# Patient Record
Sex: FEMALE | Race: WHITE | NOT HISPANIC OR LATINO
[De-identification: names, ages, dates, MRNs, and addresses within clinical notes are randomized per-mention and may not be internally consistent; named-entity substitution may affect disease eponyms.]

---

## 2017-01-02 ENCOUNTER — RESULT REVIEW (OUTPATIENT)
Age: 77
End: 2017-01-02

## 2017-01-02 PROBLEM — Z78.9 ALCOHOL INGESTION: Status: ACTIVE | Noted: 2017-01-02

## 2017-01-03 ENCOUNTER — APPOINTMENT (OUTPATIENT)
Dept: HEMATOLOGY ONCOLOGY | Facility: CLINIC | Age: 77
End: 2017-01-03

## 2017-01-03 ENCOUNTER — APPOINTMENT (OUTPATIENT)
Age: 77
End: 2017-01-03

## 2017-01-03 VITALS
SYSTOLIC BLOOD PRESSURE: 162 MMHG | OXYGEN SATURATION: 100 % | BODY MASS INDEX: 20.2 KG/M2 | DIASTOLIC BLOOD PRESSURE: 66 MMHG | TEMPERATURE: 97.9 F | WEIGHT: 123.9 LBS | HEART RATE: 78 BPM | RESPIRATION RATE: 16 BRPM

## 2017-01-03 DIAGNOSIS — Z80.1 FAMILY HISTORY OF MALIGNANT NEOPLASM OF TRACHEA, BRONCHUS AND LUNG: ICD-10-CM

## 2017-01-03 DIAGNOSIS — C85.90 NON-HODGKIN LYMPHOMA, UNSPECIFIED, UNSPECIFIED SITE: ICD-10-CM

## 2017-01-03 DIAGNOSIS — G47.30 SLEEP APNEA, UNSPECIFIED: ICD-10-CM

## 2017-01-03 DIAGNOSIS — Z78.9 OTHER SPECIFIED HEALTH STATUS: ICD-10-CM

## 2017-01-03 DIAGNOSIS — D46.1 REFRACTORY ANEMIA WITH RING SIDEROBLASTS: ICD-10-CM

## 2017-01-03 DIAGNOSIS — Z82.0 FAMILY HISTORY OF EPILEPSY AND OTHER DISEASES OF THE NERVOUS SYSTEM: ICD-10-CM

## 2017-01-03 DIAGNOSIS — D47.3 ESSENTIAL (HEMORRHAGIC) THROMBOCYTHEMIA: ICD-10-CM

## 2017-01-03 DIAGNOSIS — J44.9 CHRONIC OBSTRUCTIVE PULMONARY DISEASE, UNSPECIFIED: ICD-10-CM

## 2017-01-03 DIAGNOSIS — Z87.891 PERSONAL HISTORY OF NICOTINE DEPENDENCE: ICD-10-CM

## 2017-01-03 RX ORDER — DENOSUMAB 60 MG/ML
60 INJECTION SUBCUTANEOUS
Refills: 0 | Status: ACTIVE | COMMUNITY
Start: 2017-01-03

## 2017-01-16 ENCOUNTER — RESULT REVIEW (OUTPATIENT)
Age: 77
End: 2017-01-16

## 2017-01-17 ENCOUNTER — APPOINTMENT (OUTPATIENT)
Dept: INFUSION THERAPY | Facility: HOSPITAL | Age: 77
End: 2017-01-17

## 2017-01-26 ENCOUNTER — OUTPATIENT (OUTPATIENT)
Dept: OUTPATIENT SERVICES | Facility: HOSPITAL | Age: 77
LOS: 1 days | Discharge: HOME | End: 2017-01-26

## 2017-01-26 DIAGNOSIS — C88.4 EXTRANODAL MARGINAL ZONE B-CELL LYMPHOMA OF MUCOSA-ASSOCIATED LYMPHOID TISSUE [MALT-LYMPHOMA]: ICD-10-CM

## 2017-01-26 DIAGNOSIS — D46.20 REFRACTORY ANEMIA WITH EXCESS OF BLASTS, UNSPECIFIED: ICD-10-CM

## 2017-02-02 ENCOUNTER — RESULT REVIEW (OUTPATIENT)
Age: 77
End: 2017-02-02

## 2017-02-03 ENCOUNTER — APPOINTMENT (OUTPATIENT)
Dept: INFUSION THERAPY | Facility: HOSPITAL | Age: 77
End: 2017-02-03

## 2017-02-03 LAB
BASOPHILS # BLD AUTO: 0.1 K/UL — SIGNIFICANT CHANGE UP (ref 0–0.2)
BASOPHILS NFR BLD AUTO: 0.7 % — SIGNIFICANT CHANGE UP (ref 0–2)
EOSINOPHIL # BLD AUTO: 0.1 K/UL — SIGNIFICANT CHANGE UP (ref 0–0.5)
EOSINOPHIL NFR BLD AUTO: 1.4 % — SIGNIFICANT CHANGE UP (ref 0–6)
HCT VFR BLD CALC: 31.5 % — LOW (ref 34.5–45)
HGB BLD-MCNC: 10.1 G/DL — LOW (ref 11.5–15.5)
LYMPHOCYTES # BLD AUTO: 1.3 K/UL — SIGNIFICANT CHANGE UP (ref 1–3.3)
LYMPHOCYTES # BLD AUTO: 15.3 % — SIGNIFICANT CHANGE UP (ref 13–44)
MCHC RBC-ENTMCNC: 32 GM/DL — SIGNIFICANT CHANGE UP (ref 32–36)
MCHC RBC-ENTMCNC: 32.7 PG — SIGNIFICANT CHANGE UP (ref 27–34)
MCV RBC AUTO: 102 FL — HIGH (ref 80–100)
MONOCYTES # BLD AUTO: 0.5 K/UL — SIGNIFICANT CHANGE UP (ref 0–0.9)
MONOCYTES NFR BLD AUTO: 5.4 % — SIGNIFICANT CHANGE UP (ref 2–14)
NEUTROPHILS # BLD AUTO: 6.7 K/UL — SIGNIFICANT CHANGE UP (ref 1.8–7.4)
NEUTROPHILS NFR BLD AUTO: 77.2 % — HIGH (ref 43–77)
PLATELET # BLD AUTO: 488 K/UL — HIGH (ref 150–400)
RBC # BLD: 3.09 M/UL — LOW (ref 3.8–5.2)
RBC # FLD: 28.8 % — HIGH (ref 10.3–14.5)
WBC # BLD: 8.7 K/UL — SIGNIFICANT CHANGE UP (ref 3.8–10.5)
WBC # FLD AUTO: 8.7 K/UL — SIGNIFICANT CHANGE UP (ref 3.8–10.5)

## 2017-02-27 ENCOUNTER — APPOINTMENT (OUTPATIENT)
Dept: HEMATOLOGY ONCOLOGY | Facility: CLINIC | Age: 77
End: 2017-02-27

## 2017-02-27 ENCOUNTER — APPOINTMENT (OUTPATIENT)
Dept: INFUSION THERAPY | Facility: HOSPITAL | Age: 77
End: 2017-02-27

## 2017-03-02 ENCOUNTER — OUTPATIENT (OUTPATIENT)
Dept: OUTPATIENT SERVICES | Facility: HOSPITAL | Age: 77
LOS: 1 days | Discharge: HOME | End: 2017-03-02

## 2017-03-02 DIAGNOSIS — C88.4 EXTRANODAL MARGINAL ZONE B-CELL LYMPHOMA OF MUCOSA-ASSOCIATED LYMPHOID TISSUE [MALT-LYMPHOMA]: ICD-10-CM

## 2017-03-02 DIAGNOSIS — D46.20 REFRACTORY ANEMIA WITH EXCESS OF BLASTS, UNSPECIFIED: ICD-10-CM

## 2017-03-06 ENCOUNTER — APPOINTMENT (OUTPATIENT)
Dept: INFUSION THERAPY | Facility: HOSPITAL | Age: 77
End: 2017-03-06

## 2017-05-05 ENCOUNTER — LABORATORY RESULT (OUTPATIENT)
Age: 77
End: 2017-05-05

## 2017-05-05 ENCOUNTER — OUTPATIENT (OUTPATIENT)
Dept: OUTPATIENT SERVICES | Facility: HOSPITAL | Age: 77
LOS: 1 days | Discharge: HOME | End: 2017-05-05

## 2017-05-05 ENCOUNTER — APPOINTMENT (OUTPATIENT)
Dept: HEMATOLOGY ONCOLOGY | Facility: CLINIC | Age: 77
End: 2017-05-05

## 2017-05-05 ENCOUNTER — RESULT REVIEW (OUTPATIENT)
Age: 77
End: 2017-05-05

## 2017-05-05 ENCOUNTER — OUTPATIENT (OUTPATIENT)
Dept: OUTPATIENT SERVICES | Facility: HOSPITAL | Age: 77
LOS: 1 days | End: 2017-05-05
Payer: MEDICARE

## 2017-05-05 DIAGNOSIS — D50.9 IRON DEFICIENCY ANEMIA, UNSPECIFIED: ICD-10-CM

## 2017-05-05 DIAGNOSIS — D46.20 REFRACTORY ANEMIA WITH EXCESS OF BLASTS, UNSPECIFIED: ICD-10-CM

## 2017-05-05 DIAGNOSIS — C88.4 EXTRANODAL MARGINAL ZONE B-CELL LYMPHOMA OF MUCOSA-ASSOCIATED LYMPHOID TISSUE [MALT-LYMPHOMA]: ICD-10-CM

## 2017-05-05 LAB
BASOPHILS # BLD AUTO: 0.1 K/UL — SIGNIFICANT CHANGE UP (ref 0–0.2)
BASOPHILS NFR BLD AUTO: 0.7 % — SIGNIFICANT CHANGE UP (ref 0–2)
CA-I BLD-SCNC: 1.28 MMOL/L — SIGNIFICANT CHANGE UP (ref 1.05–1.34)
EOSINOPHIL # BLD AUTO: 0.3 K/UL — SIGNIFICANT CHANGE UP (ref 0–0.5)
EOSINOPHIL NFR BLD AUTO: 3.3 % — SIGNIFICANT CHANGE UP (ref 0–6)
HCT VFR BLD CALC: 29.1 % — LOW (ref 34.5–45)
HGB BLD-MCNC: 9.7 G/DL — LOW (ref 11.5–15.5)
LYMPHOCYTES # BLD AUTO: 1.3 K/UL — SIGNIFICANT CHANGE UP (ref 1–3.3)
LYMPHOCYTES # BLD AUTO: 14 % — SIGNIFICANT CHANGE UP (ref 13–44)
MCHC RBC-ENTMCNC: 33.3 G/DL — SIGNIFICANT CHANGE UP (ref 32–36)
MCHC RBC-ENTMCNC: 33.7 PG — SIGNIFICANT CHANGE UP (ref 27–34)
MCV RBC AUTO: 101 FL — HIGH (ref 80–100)
MONOCYTES # BLD AUTO: 0.6 K/UL — SIGNIFICANT CHANGE UP (ref 0–0.9)
MONOCYTES NFR BLD AUTO: 6.4 % — SIGNIFICANT CHANGE UP (ref 2–14)
NEUTROPHILS # BLD AUTO: 7 K/UL — SIGNIFICANT CHANGE UP (ref 1.8–7.4)
NEUTROPHILS NFR BLD AUTO: 75.6 % — SIGNIFICANT CHANGE UP (ref 43–77)
PLATELET # BLD AUTO: 467 K/UL — HIGH (ref 150–400)
RBC # BLD: 2.87 M/UL — LOW (ref 3.8–5.2)
RBC # FLD: 27.7 % — HIGH (ref 10.3–14.5)
WBC # BLD: 9.2 K/UL — SIGNIFICANT CHANGE UP (ref 3.8–10.5)
WBC # FLD AUTO: 9.2 K/UL — SIGNIFICANT CHANGE UP (ref 3.8–10.5)

## 2017-05-05 PROCEDURE — 82330 ASSAY OF CALCIUM: CPT

## 2017-05-16 ENCOUNTER — APPOINTMENT (OUTPATIENT)
Dept: INFUSION THERAPY | Facility: HOSPITAL | Age: 77
End: 2017-05-16

## 2017-05-16 ENCOUNTER — APPOINTMENT (OUTPATIENT)
Dept: HEMATOLOGY ONCOLOGY | Facility: CLINIC | Age: 77
End: 2017-05-16

## 2017-05-16 ENCOUNTER — RESULT REVIEW (OUTPATIENT)
Age: 77
End: 2017-05-16

## 2017-05-16 VITALS
WEIGHT: 127.87 LBS | OXYGEN SATURATION: 98 % | RESPIRATION RATE: 16 BRPM | TEMPERATURE: 98.1 F | HEART RATE: 74 BPM | DIASTOLIC BLOOD PRESSURE: 60 MMHG | SYSTOLIC BLOOD PRESSURE: 120 MMHG | BODY MASS INDEX: 20.85 KG/M2

## 2017-05-16 DIAGNOSIS — C88.4 EXTRANODAL MARGINAL ZONE B-CELL LYMPHOMA OF MUCOSA-ASSOCIATED LYMPHOID TISSUE [MALT-LYMPHOMA]: ICD-10-CM

## 2017-05-16 LAB
BASOPHILS # BLD AUTO: 0.1 K/UL — SIGNIFICANT CHANGE UP (ref 0–0.2)
BASOPHILS NFR BLD AUTO: 0.8 % — SIGNIFICANT CHANGE UP (ref 0–2)
EOSINOPHIL # BLD AUTO: 0.1 K/UL — SIGNIFICANT CHANGE UP (ref 0–0.5)
EOSINOPHIL NFR BLD AUTO: 0.8 % — SIGNIFICANT CHANGE UP (ref 0–6)
HCT VFR BLD CALC: 31.4 % — LOW (ref 34.5–45)
HGB BLD-MCNC: 10.6 G/DL — LOW (ref 11.5–15.5)
LYMPHOCYTES # BLD AUTO: 1 K/UL — SIGNIFICANT CHANGE UP (ref 1–3.3)
LYMPHOCYTES # BLD AUTO: 11 % — LOW (ref 13–44)
MCHC RBC-ENTMCNC: 33.5 PG — SIGNIFICANT CHANGE UP (ref 27–34)
MCHC RBC-ENTMCNC: 33.7 G/DL — SIGNIFICANT CHANGE UP (ref 32–36)
MCV RBC AUTO: 99.5 FL — SIGNIFICANT CHANGE UP (ref 80–100)
MONOCYTES # BLD AUTO: 0.5 K/UL — SIGNIFICANT CHANGE UP (ref 0–0.9)
MONOCYTES NFR BLD AUTO: 5.2 % — SIGNIFICANT CHANGE UP (ref 2–14)
NEUTROPHILS # BLD AUTO: 7.9 K/UL — HIGH (ref 1.8–7.4)
NEUTROPHILS NFR BLD AUTO: 82.2 % — HIGH (ref 43–77)
PLATELET # BLD AUTO: 453 K/UL — HIGH (ref 150–400)
RBC # BLD: 3.16 M/UL — LOW (ref 3.8–5.2)
RBC # FLD: 26.8 % — HIGH (ref 10.3–14.5)
WBC # BLD: 9.6 K/UL — SIGNIFICANT CHANGE UP (ref 3.8–10.5)
WBC # FLD AUTO: 9.6 K/UL — SIGNIFICANT CHANGE UP (ref 3.8–10.5)

## 2017-05-16 RX ORDER — HYDROCODONE BITARTRATE AND ACETAMINOPHEN 10; 325 MG/1; MG/1
10-325 TABLET ORAL
Refills: 0 | Status: ACTIVE | COMMUNITY

## 2017-07-18 ENCOUNTER — RESULT REVIEW (OUTPATIENT)
Age: 77
End: 2017-07-18

## 2017-07-18 ENCOUNTER — OUTPATIENT (OUTPATIENT)
Dept: OUTPATIENT SERVICES | Facility: HOSPITAL | Age: 77
LOS: 1 days | Discharge: HOME | End: 2017-07-18

## 2017-07-18 ENCOUNTER — APPOINTMENT (OUTPATIENT)
Dept: INFUSION THERAPY | Facility: HOSPITAL | Age: 77
End: 2017-07-18

## 2017-07-18 DIAGNOSIS — D46.20 REFRACTORY ANEMIA WITH EXCESS OF BLASTS, UNSPECIFIED: ICD-10-CM

## 2017-07-18 LAB
ANISOCYTOSIS BLD QL: SLIGHT — SIGNIFICANT CHANGE UP
ELLIPTOCYTES BLD QL SMEAR: SLIGHT — SIGNIFICANT CHANGE UP
EOSINOPHIL # BLD AUTO: 0 K/UL — SIGNIFICANT CHANGE UP (ref 0–0.5)
EOSINOPHIL NFR BLD AUTO: 3 % — SIGNIFICANT CHANGE UP (ref 0–6)
HCT VFR BLD CALC: 27.4 % — LOW (ref 34.5–45)
HGB BLD-MCNC: 9.7 G/DL — LOW (ref 11.5–15.5)
HYPOCHROMIA BLD QL: SLIGHT — SIGNIFICANT CHANGE UP
LYMPHOCYTES # BLD AUTO: 1.7 K/UL — SIGNIFICANT CHANGE UP (ref 1–3.3)
LYMPHOCYTES # BLD AUTO: 22 % — SIGNIFICANT CHANGE UP (ref 13–44)
MACROCYTES BLD QL: SLIGHT — SIGNIFICANT CHANGE UP
MCHC RBC-ENTMCNC: 34.9 PG — HIGH (ref 27–34)
MCHC RBC-ENTMCNC: 35.4 G/DL — SIGNIFICANT CHANGE UP (ref 32–36)
MCV RBC AUTO: 98.4 FL — SIGNIFICANT CHANGE UP (ref 80–100)
MONOCYTES # BLD AUTO: 0.4 K/UL — SIGNIFICANT CHANGE UP (ref 0–0.9)
MONOCYTES NFR BLD AUTO: 7 % — SIGNIFICANT CHANGE UP (ref 2–14)
NEUTROPHILS # BLD AUTO: 5.7 K/UL — SIGNIFICANT CHANGE UP (ref 1.8–7.4)
NEUTROPHILS NFR BLD AUTO: 68 % — SIGNIFICANT CHANGE UP (ref 43–77)
PLAT MORPH BLD: NORMAL — SIGNIFICANT CHANGE UP
PLATELET # BLD AUTO: 456 K/UL — HIGH (ref 150–400)
POIKILOCYTOSIS BLD QL AUTO: SLIGHT — SIGNIFICANT CHANGE UP
POLYCHROMASIA BLD QL SMEAR: SLIGHT — SIGNIFICANT CHANGE UP
RBC # BLD: 2.79 M/UL — LOW (ref 3.8–5.2)
RBC # FLD: 26.3 % — HIGH (ref 10.3–14.5)
RBC BLD AUTO: ABNORMAL
SCHISTOCYTES BLD QL AUTO: SLIGHT — SIGNIFICANT CHANGE UP
WBC # BLD: 8 K/UL — SIGNIFICANT CHANGE UP (ref 3.8–10.5)
WBC # FLD AUTO: 8 K/UL — SIGNIFICANT CHANGE UP (ref 3.8–10.5)

## 2017-08-26 ENCOUNTER — TRANSCRIPTION ENCOUNTER (OUTPATIENT)
Age: 77
End: 2017-08-26

## 2017-09-01 ENCOUNTER — OUTPATIENT (OUTPATIENT)
Dept: OUTPATIENT SERVICES | Facility: HOSPITAL | Age: 77
LOS: 1 days | Discharge: HOME | End: 2017-09-01

## 2017-09-01 DIAGNOSIS — D46.20 REFRACTORY ANEMIA WITH EXCESS OF BLASTS, UNSPECIFIED: ICD-10-CM

## 2017-09-01 DIAGNOSIS — C88.4 EXTRANODAL MARGINAL ZONE B-CELL LYMPHOMA OF MUCOSA-ASSOCIATED LYMPHOID TISSUE [MALT-LYMPHOMA]: ICD-10-CM

## 2017-09-07 ENCOUNTER — APPOINTMENT (OUTPATIENT)
Dept: HEMATOLOGY ONCOLOGY | Facility: CLINIC | Age: 77
End: 2017-09-07

## 2018-01-25 ENCOUNTER — APPOINTMENT (OUTPATIENT)
Dept: NEUROLOGY | Facility: CLINIC | Age: 78
End: 2018-01-25
Payer: MEDICARE

## 2018-01-25 VITALS
BODY MASS INDEX: 20.09 KG/M2 | SYSTOLIC BLOOD PRESSURE: 120 MMHG | HEIGHT: 66 IN | DIASTOLIC BLOOD PRESSURE: 63 MMHG | WEIGHT: 125 LBS

## 2018-01-25 DIAGNOSIS — G31.84 MILD COGNITIVE IMPAIRMENT, SO STATED: ICD-10-CM

## 2018-01-25 PROCEDURE — 99204 OFFICE O/P NEW MOD 45 MIN: CPT

## 2018-01-25 RX ORDER — DOXYCYCLINE 100 MG/1
100 CAPSULE ORAL
Qty: 42 | Refills: 0 | Status: ACTIVE | COMMUNITY
Start: 2017-08-26

## 2018-01-25 RX ORDER — CEFUROXIME AXETIL 500 MG/1
500 TABLET ORAL
Qty: 20 | Refills: 0 | Status: ACTIVE | COMMUNITY
Start: 2017-09-11

## 2018-01-25 RX ORDER — LEVOFLOXACIN 500 MG/1
500 TABLET, FILM COATED ORAL
Qty: 10 | Refills: 0 | Status: ACTIVE | COMMUNITY
Start: 2017-09-05

## 2018-01-25 RX ORDER — FLUCONAZOLE 100 MG/1
100 TABLET ORAL
Qty: 10 | Refills: 0 | Status: ACTIVE | COMMUNITY
Start: 2017-10-03

## 2018-01-25 RX ORDER — METHYLPREDNISOLONE 4 MG/1
4 TABLET ORAL
Qty: 21 | Refills: 0 | Status: ACTIVE | COMMUNITY
Start: 2017-09-11

## 2018-01-25 RX ORDER — CELECOXIB 200 MG/1
200 CAPSULE ORAL
Qty: 30 | Refills: 0 | Status: ACTIVE | COMMUNITY
Start: 2017-08-21

## 2018-01-29 ENCOUNTER — APPOINTMENT (OUTPATIENT)
Dept: NEUROLOGY | Facility: CLINIC | Age: 78
End: 2018-01-29

## 2018-02-20 ENCOUNTER — FORM ENCOUNTER (OUTPATIENT)
Age: 78
End: 2018-02-20

## 2018-02-21 ENCOUNTER — OUTPATIENT (OUTPATIENT)
Dept: OUTPATIENT SERVICES | Facility: HOSPITAL | Age: 78
LOS: 1 days | End: 2018-02-21

## 2018-02-21 ENCOUNTER — APPOINTMENT (OUTPATIENT)
Dept: MRI IMAGING | Facility: CLINIC | Age: 78
End: 2018-02-21
Payer: MEDICARE

## 2018-02-21 DIAGNOSIS — G31.84 MILD COGNITIVE IMPAIRMENT OF UNCERTAIN OR UNKNOWN ETIOLOGY: ICD-10-CM

## 2018-02-21 PROCEDURE — 70551 MRI BRAIN STEM W/O DYE: CPT | Mod: 26

## 2018-03-21 ENCOUNTER — APPOINTMENT (OUTPATIENT)
Dept: NEUROLOGY | Facility: CLINIC | Age: 78
End: 2018-03-21

## 2018-05-20 ENCOUNTER — EMERGENCY (EMERGENCY)
Facility: HOSPITAL | Age: 78
LOS: 1 days | Discharge: HOME | End: 2018-05-20
Attending: EMERGENCY MEDICINE
Payer: MEDICARE

## 2018-05-20 VITALS
OXYGEN SATURATION: 100 % | RESPIRATION RATE: 18 BRPM | DIASTOLIC BLOOD PRESSURE: 78 MMHG | HEART RATE: 66 BPM | SYSTOLIC BLOOD PRESSURE: 161 MMHG | TEMPERATURE: 98 F

## 2018-05-20 VITALS — WEIGHT: 125 LBS | HEIGHT: 66 IN

## 2018-05-20 LAB
ALBUMIN SERPL ELPH-MCNC: 4.2 G/DL — SIGNIFICANT CHANGE UP (ref 3.3–5.2)
ALP SERPL-CCNC: 59 U/L — SIGNIFICANT CHANGE UP (ref 40–120)
ALT FLD-CCNC: 6 U/L — SIGNIFICANT CHANGE UP
ANION GAP SERPL CALC-SCNC: 14 MMOL/L — SIGNIFICANT CHANGE UP (ref 5–17)
ANISOCYTOSIS BLD QL: SIGNIFICANT CHANGE UP
APPEARANCE UR: CLEAR — SIGNIFICANT CHANGE UP
APTT BLD: 30.6 SEC — SIGNIFICANT CHANGE UP (ref 27.5–37.4)
AST SERPL-CCNC: 12 U/L — SIGNIFICANT CHANGE UP
BILIRUB SERPL-MCNC: 0.4 MG/DL — SIGNIFICANT CHANGE UP (ref 0.4–2)
BILIRUB UR-MCNC: NEGATIVE — SIGNIFICANT CHANGE UP
BLD GP AB SCN SERPL QL: SIGNIFICANT CHANGE UP
BUN SERPL-MCNC: 18 MG/DL — SIGNIFICANT CHANGE UP (ref 8–20)
CALCIUM SERPL-MCNC: 9.8 MG/DL — SIGNIFICANT CHANGE UP (ref 8.6–10.2)
CHLORIDE SERPL-SCNC: 102 MMOL/L — SIGNIFICANT CHANGE UP (ref 98–107)
CO2 SERPL-SCNC: 25 MMOL/L — SIGNIFICANT CHANGE UP (ref 22–29)
COLOR SPEC: YELLOW — SIGNIFICANT CHANGE UP
CREAT SERPL-MCNC: 0.75 MG/DL — SIGNIFICANT CHANGE UP (ref 0.5–1.3)
DACRYOCYTES BLD QL SMEAR: SLIGHT — SIGNIFICANT CHANGE UP
DIFF PNL FLD: NEGATIVE — SIGNIFICANT CHANGE UP
ELLIPTOCYTES BLD QL SMEAR: SLIGHT — SIGNIFICANT CHANGE UP
EOSINOPHIL NFR BLD AUTO: 2 % — SIGNIFICANT CHANGE UP (ref 0–5)
GLUCOSE SERPL-MCNC: 93 MG/DL — SIGNIFICANT CHANGE UP (ref 70–115)
GLUCOSE UR QL: NEGATIVE MG/DL — SIGNIFICANT CHANGE UP
HCT VFR BLD CALC: 26.7 % — LOW (ref 37–47)
HGB BLD-MCNC: 8.4 G/DL — LOW (ref 12–16)
HYPOCHROMIA BLD QL: SLIGHT — SIGNIFICANT CHANGE UP
INR BLD: 1.12 RATIO — SIGNIFICANT CHANGE UP (ref 0.88–1.16)
KETONES UR-MCNC: NEGATIVE — SIGNIFICANT CHANGE UP
LEUKOCYTE ESTERASE UR-ACNC: NEGATIVE — SIGNIFICANT CHANGE UP
LYMPHOCYTES # BLD AUTO: 22 % — SIGNIFICANT CHANGE UP (ref 20–55)
MACROCYTES BLD QL: SLIGHT — SIGNIFICANT CHANGE UP
MCHC RBC-ENTMCNC: 30.4 PG — SIGNIFICANT CHANGE UP (ref 27–31)
MCHC RBC-ENTMCNC: 31.5 G/DL — LOW (ref 32–36)
MCV RBC AUTO: 96.7 FL — SIGNIFICANT CHANGE UP (ref 81–99)
MICROCYTES BLD QL: SLIGHT — SIGNIFICANT CHANGE UP
MONOCYTES NFR BLD AUTO: 7 % — SIGNIFICANT CHANGE UP (ref 3–10)
NEUTROPHILS NFR BLD AUTO: 69 % — SIGNIFICANT CHANGE UP (ref 37–73)
NITRITE UR-MCNC: NEGATIVE — SIGNIFICANT CHANGE UP
NT-PROBNP SERPL-SCNC: 519 PG/ML — HIGH (ref 0–300)
PH UR: 6.5 — SIGNIFICANT CHANGE UP (ref 5–8)
PLAT MORPH BLD: NORMAL — SIGNIFICANT CHANGE UP
PLATELET # BLD AUTO: 456 K/UL — HIGH (ref 150–400)
POIKILOCYTOSIS BLD QL AUTO: SLIGHT — SIGNIFICANT CHANGE UP
POTASSIUM SERPL-MCNC: 4.9 MMOL/L — SIGNIFICANT CHANGE UP (ref 3.5–5.3)
POTASSIUM SERPL-SCNC: 4.9 MMOL/L — SIGNIFICANT CHANGE UP (ref 3.5–5.3)
PROT SERPL-MCNC: 7 G/DL — SIGNIFICANT CHANGE UP (ref 6.6–8.7)
PROT UR-MCNC: NEGATIVE MG/DL — SIGNIFICANT CHANGE UP
PROTHROM AB SERPL-ACNC: 12.3 SEC — SIGNIFICANT CHANGE UP (ref 9.8–12.7)
RBC # BLD: 2.76 M/UL — LOW (ref 4.4–5.2)
RBC # FLD: 28 % — HIGH (ref 11–15.6)
RBC BLD AUTO: ABNORMAL
SCHISTOCYTES BLD QL AUTO: SLIGHT — SIGNIFICANT CHANGE UP
SODIUM SERPL-SCNC: 141 MMOL/L — SIGNIFICANT CHANGE UP (ref 135–145)
SP GR SPEC: 1 — LOW (ref 1.01–1.02)
TARGETS BLD QL SMEAR: SLIGHT — SIGNIFICANT CHANGE UP
TROPONIN T SERPL-MCNC: <0.01 NG/ML — SIGNIFICANT CHANGE UP (ref 0–0.06)
TYPE + AB SCN PNL BLD: SIGNIFICANT CHANGE UP
UROBILINOGEN FLD QL: NEGATIVE MG/DL — SIGNIFICANT CHANGE UP
WBC # BLD: 7.7 K/UL — SIGNIFICANT CHANGE UP (ref 4.8–10.8)
WBC # FLD AUTO: 7.7 K/UL — SIGNIFICANT CHANGE UP (ref 4.8–10.8)

## 2018-05-20 PROCEDURE — 86901 BLOOD TYPING SEROLOGIC RH(D): CPT

## 2018-05-20 PROCEDURE — 70450 CT HEAD/BRAIN W/O DYE: CPT

## 2018-05-20 PROCEDURE — 99284 EMERGENCY DEPT VISIT MOD MDM: CPT | Mod: 25

## 2018-05-20 PROCEDURE — 86900 BLOOD TYPING SEROLOGIC ABO: CPT

## 2018-05-20 PROCEDURE — 85730 THROMBOPLASTIN TIME PARTIAL: CPT

## 2018-05-20 PROCEDURE — 70450 CT HEAD/BRAIN W/O DYE: CPT | Mod: 26

## 2018-05-20 PROCEDURE — 71046 X-RAY EXAM CHEST 2 VIEWS: CPT | Mod: 26

## 2018-05-20 PROCEDURE — 71260 CT THORAX DX C+: CPT | Mod: 26

## 2018-05-20 PROCEDURE — 93005 ELECTROCARDIOGRAM TRACING: CPT

## 2018-05-20 PROCEDURE — 81003 URINALYSIS AUTO W/O SCOPE: CPT

## 2018-05-20 PROCEDURE — 85610 PROTHROMBIN TIME: CPT

## 2018-05-20 PROCEDURE — 74177 CT ABD & PELVIS W/CONTRAST: CPT | Mod: 26

## 2018-05-20 PROCEDURE — 86850 RBC ANTIBODY SCREEN: CPT

## 2018-05-20 PROCEDURE — 80053 COMPREHEN METABOLIC PANEL: CPT

## 2018-05-20 PROCEDURE — 74177 CT ABD & PELVIS W/CONTRAST: CPT

## 2018-05-20 PROCEDURE — 85027 COMPLETE CBC AUTOMATED: CPT

## 2018-05-20 PROCEDURE — 71260 CT THORAX DX C+: CPT

## 2018-05-20 PROCEDURE — 93010 ELECTROCARDIOGRAM REPORT: CPT

## 2018-05-20 PROCEDURE — 83880 ASSAY OF NATRIURETIC PEPTIDE: CPT

## 2018-05-20 PROCEDURE — 71046 X-RAY EXAM CHEST 2 VIEWS: CPT

## 2018-05-20 PROCEDURE — 87086 URINE CULTURE/COLONY COUNT: CPT

## 2018-05-20 PROCEDURE — 36415 COLL VENOUS BLD VENIPUNCTURE: CPT

## 2018-05-20 PROCEDURE — 99284 EMERGENCY DEPT VISIT MOD MDM: CPT

## 2018-05-20 PROCEDURE — 84484 ASSAY OF TROPONIN QUANT: CPT

## 2018-05-20 RX ORDER — SODIUM CHLORIDE 9 MG/ML
1000 INJECTION INTRAMUSCULAR; INTRAVENOUS; SUBCUTANEOUS ONCE
Qty: 0 | Refills: 0 | Status: DISCONTINUED | OUTPATIENT
Start: 2018-05-20 | End: 2018-05-20

## 2018-05-20 RX ORDER — NITROFURANTOIN MACROCRYSTAL 50 MG
1 CAPSULE ORAL
Qty: 14 | Refills: 0 | OUTPATIENT
Start: 2018-05-20 | End: 2018-05-26

## 2018-05-20 RX ORDER — ACETAMINOPHEN 500 MG
650 TABLET ORAL ONCE
Qty: 0 | Refills: 0 | Status: COMPLETED | OUTPATIENT
Start: 2018-05-20 | End: 2018-05-20

## 2018-05-20 RX ADMIN — Medication 650 MILLIGRAM(S): at 19:42

## 2018-05-20 RX ADMIN — Medication 650 MILLIGRAM(S): at 20:05

## 2018-05-20 NOTE — ED PROVIDER NOTE - PROGRESS NOTE DETAILS
Pt. is anemic. Pt. showing signs of dementia.  Awaiting rest of blood work and CT scans. Pt. earlier stated that she had abdominal pain , but now denies any pain to her abdomen. Pt. is a poor historian. Pt. is anemic. Pt. has minimal symptoms. Stable vitals. Pt. showing signs of dementia.  Awaiting rest of blood work and CT scans. Pt. earlier stated that she had abdominal pain , but now denies any pain to her abdomen. Pt. is a poor historian. Pt. with her niece(eHlga) at the bedside. Labs and CT results discussed with the patient. I advised family advised to have pt. follow up with her PMD. Pt. will need to have her BP checked. Pt. also has chronic anemia and should be on iron pills and a stool softener. Pt. also should not be driving.

## 2018-05-20 NOTE — ED PROVIDER NOTE - OBJECTIVE STATEMENT
I was called to intake to evaluate this patient with PMH significant for COPD, anemia and lymphoma who presents complaining of altered mental status, increased lethargy and abnormal behavoir. Patient's niece states this has been going on for months, but the patient has not followed up with any physicians as she forgets to go or who she is supposed to go to. Further pertinent history includes that patient had an MRI of her brain a few months ago, but never went to follow up. She also complains of chest pain and abdominal pain. She states she feels "strange," as though she is having an out of body experience. patient with PMH significant for COPD, anemia and lymphoma who presents complaining of altered mental status, increased lethargy and abnormal behavior. Pt. states that she has been feeling very weak/tired. Patient's niece states this has been going on for months, but the patient has not followed up with any physicians as she forgets to go or who she is supposed to go to. Pt. also c/o Left lower abdominal pain on and off for years. Pt. is constipated and last BM was 4 days ago. PT. has had chest pain in the past but NO chest pain today. Further pertinent history includes that patient had an MRI of her brain a few months ago, but never went to follow up.

## 2018-05-20 NOTE — ED PROVIDER NOTE - MEDICAL DECISION MAKING DETAILS
Pt. with complaint of weakness/fatigue and increasing confusion for the past few months. Will check labs/Head CT and re-evaluated.

## 2018-05-20 NOTE — ED STATDOCS - PROGRESS NOTE DETAILS
I was called to intake to evaluate this patient with PMH significant for COPD, anemia and lymphoma who presents complaining of altered mental status, increased lethargy and abnormal behavoir. Patient's niece states this has been going on for months, but the patient has not followed up with any physicians as she forgets to go or who she is supposed to go to. Further pertinent history includes that patient had an MRI of her brain a few months ago, but never went to follow up. She also complains of chest pain and abdominal pain. She states she feels "strange," as though she is having an out of body experience.  Exam: No distress, RRR, Lungs CTA, Abd soft, but diffusely tender, skin is pale, but not diaphoretic.  Patient will be sent to the main for further evaluation and work up due to the complicated nature of her complaint. Initial orders placed.

## 2018-05-20 NOTE — ED ADULT NURSE REASSESSMENT NOTE - NS ED NURSE REASSESS COMMENT FT1
Family @ bedside. Dr. Oshea updating family with results, they are aware pt is pending other radiology exam results.

## 2018-05-20 NOTE — ED ADULT NURSE REASSESSMENT NOTE - NS ED NURSE REASSESS COMMENT FT1
Pt. received at 1930, ambulating to bathroom without difficulty. Pt. oriented to person and , aware the year is 2018, confused to month and situation. aware she is in hospital, unsure as to which. Pt. Vital signs within defined limits. environment safe. will continue to monitor and maintain safety.

## 2018-05-21 LAB
CULTURE RESULTS: NO GROWTH — SIGNIFICANT CHANGE UP
SPECIMEN SOURCE: SIGNIFICANT CHANGE UP

## 2019-04-09 LAB
ALBUMIN SERPL ELPH-MCNC: 4.5 G/DL
ALP BLD-CCNC: 77 U/L
ALT SERPL-CCNC: 13 U/L
ANION GAP SERPL CALC-SCNC: 15 MMOL/L
AST SERPL-CCNC: 15 U/L
B2 MICROGLOB SERPL-MCNC: 3 MG/L
BILIRUB SERPL-MCNC: 0.4 MG/DL
BUN SERPL-MCNC: 20 MG/DL
CALCIUM SERPL-MCNC: 9.7 MG/DL
CHLORIDE SERPL-SCNC: 106 MMOL/L
CO2 SERPL-SCNC: 22 MMOL/L
CREAT SERPL-MCNC: 0.86 MG/DL
DEPRECATED KAPPA LC FREE/LAMBDA SER: 1.37 RATIO
GLUCOSE SERPL-MCNC: 94 MG/DL
IGA SER QL IEP: 100 MG/DL
IGG SER QL IEP: 1220 MG/DL
IGM SER QL IEP: 147 MG/DL
KAPPA LC CSF-MCNC: 1.71 MG/DL
KAPPA LC SERPL-MCNC: 2.34 MG/DL
LDH SERPL-CCNC: 188 U/L
POTASSIUM SERPL-SCNC: 4.5 MMOL/L
PROT SERPL-MCNC: 7.4 G/DL
SODIUM SERPL-SCNC: 143 MMOL/L

## 2020-07-29 ENCOUNTER — INPATIENT (INPATIENT)
Facility: HOSPITAL | Age: 80
LOS: 7 days | Discharge: HOME | DRG: 884 | End: 2020-08-06
Attending: HOSPITALIST | Admitting: HOSPITALIST
Payer: MEDICARE

## 2020-07-29 VITALS
HEIGHT: 66 IN | HEART RATE: 92 BPM | RESPIRATION RATE: 18 BRPM | WEIGHT: 115.08 LBS | DIASTOLIC BLOOD PRESSURE: 66 MMHG | OXYGEN SATURATION: 95 % | SYSTOLIC BLOOD PRESSURE: 145 MMHG | TEMPERATURE: 99 F

## 2020-07-29 DIAGNOSIS — R41.82 ALTERED MENTAL STATUS, UNSPECIFIED: ICD-10-CM

## 2020-07-29 LAB
ALBUMIN SERPL ELPH-MCNC: 4.5 G/DL — SIGNIFICANT CHANGE UP (ref 3.3–5.2)
ALP SERPL-CCNC: 51 U/L — SIGNIFICANT CHANGE UP (ref 40–120)
ALT FLD-CCNC: 6 U/L — SIGNIFICANT CHANGE UP
ANION GAP SERPL CALC-SCNC: 15 MMOL/L — SIGNIFICANT CHANGE UP (ref 5–17)
ANISOCYTOSIS BLD QL: SIGNIFICANT CHANGE UP
APPEARANCE UR: CLEAR — SIGNIFICANT CHANGE UP
AST SERPL-CCNC: 19 U/L — SIGNIFICANT CHANGE UP
BACTERIA # UR AUTO: ABNORMAL
BASOPHILS # BLD AUTO: 0 K/UL — SIGNIFICANT CHANGE UP (ref 0–0.2)
BASOPHILS NFR BLD AUTO: 0 % — SIGNIFICANT CHANGE UP (ref 0–2)
BILIRUB SERPL-MCNC: 0.8 MG/DL — SIGNIFICANT CHANGE UP (ref 0.4–2)
BILIRUB UR-MCNC: NEGATIVE — SIGNIFICANT CHANGE UP
BUN SERPL-MCNC: 19 MG/DL — SIGNIFICANT CHANGE UP (ref 8–20)
CALCIUM SERPL-MCNC: 9.5 MG/DL — SIGNIFICANT CHANGE UP (ref 8.6–10.2)
CHLORIDE SERPL-SCNC: 103 MMOL/L — SIGNIFICANT CHANGE UP (ref 98–107)
CO2 SERPL-SCNC: 23 MMOL/L — SIGNIFICANT CHANGE UP (ref 22–29)
COLOR SPEC: YELLOW — SIGNIFICANT CHANGE UP
CREAT SERPL-MCNC: 1.23 MG/DL — SIGNIFICANT CHANGE UP (ref 0.5–1.3)
DIFF PNL FLD: ABNORMAL
ELLIPTOCYTES BLD QL SMEAR: SLIGHT — SIGNIFICANT CHANGE UP
EOSINOPHIL # BLD AUTO: 0 K/UL — SIGNIFICANT CHANGE UP (ref 0–0.5)
EOSINOPHIL NFR BLD AUTO: 0 % — SIGNIFICANT CHANGE UP (ref 0–6)
EPI CELLS # UR: SIGNIFICANT CHANGE UP
GIANT PLATELETS BLD QL SMEAR: PRESENT — SIGNIFICANT CHANGE UP
GLUCOSE SERPL-MCNC: 136 MG/DL — HIGH (ref 70–99)
GLUCOSE UR QL: NEGATIVE MG/DL — SIGNIFICANT CHANGE UP
HCT VFR BLD CALC: 32.4 % — LOW (ref 34.5–45)
HGB BLD-MCNC: 10.7 G/DL — LOW (ref 11.5–15.5)
HYPOCHROMIA BLD QL: SLIGHT — SIGNIFICANT CHANGE UP
KETONES UR-MCNC: ABNORMAL
LACTATE BLDV-MCNC: 1.8 MMOL/L — SIGNIFICANT CHANGE UP (ref 0.5–2)
LEUKOCYTE ESTERASE UR-ACNC: NEGATIVE — SIGNIFICANT CHANGE UP
LYMPHOCYTES # BLD AUTO: 1.44 K/UL — SIGNIFICANT CHANGE UP (ref 1–3.3)
LYMPHOCYTES # BLD AUTO: 14 % — SIGNIFICANT CHANGE UP (ref 13–44)
MACROCYTES BLD QL: SIGNIFICANT CHANGE UP
MAGNESIUM SERPL-MCNC: 2.1 MG/DL — SIGNIFICANT CHANGE UP (ref 1.8–2.6)
MANUAL SMEAR VERIFICATION: SIGNIFICANT CHANGE UP
MCHC RBC-ENTMCNC: 33 GM/DL — SIGNIFICANT CHANGE UP (ref 32–36)
MCHC RBC-ENTMCNC: 34.7 PG — HIGH (ref 27–34)
MCV RBC AUTO: 105.2 FL — HIGH (ref 80–100)
MONOCYTES # BLD AUTO: 0.09 K/UL — SIGNIFICANT CHANGE UP (ref 0–0.9)
MONOCYTES NFR BLD AUTO: 0.9 % — LOW (ref 2–14)
NEUTROPHILS # BLD AUTO: 8.68 K/UL — HIGH (ref 1.8–7.4)
NEUTROPHILS NFR BLD AUTO: 82.5 % — HIGH (ref 43–77)
NEUTS BAND # BLD: 1.7 % — SIGNIFICANT CHANGE UP (ref 0–8)
NITRITE UR-MCNC: NEGATIVE — SIGNIFICANT CHANGE UP
NT-PROBNP SERPL-SCNC: 1280 PG/ML — HIGH (ref 0–300)
OVALOCYTES BLD QL SMEAR: SLIGHT — SIGNIFICANT CHANGE UP
PH UR: 5 — SIGNIFICANT CHANGE UP (ref 5–8)
PLAT MORPH BLD: NORMAL — SIGNIFICANT CHANGE UP
PLATELET # BLD AUTO: 506 K/UL — HIGH (ref 150–400)
POIKILOCYTOSIS BLD QL AUTO: SLIGHT — SIGNIFICANT CHANGE UP
POLYCHROMASIA BLD QL SMEAR: SLIGHT — SIGNIFICANT CHANGE UP
POTASSIUM SERPL-MCNC: 4.4 MMOL/L — SIGNIFICANT CHANGE UP (ref 3.5–5.3)
POTASSIUM SERPL-SCNC: 4.4 MMOL/L — SIGNIFICANT CHANGE UP (ref 3.5–5.3)
PROT SERPL-MCNC: 7.1 G/DL — SIGNIFICANT CHANGE UP (ref 6.6–8.7)
PROT UR-MCNC: 30 MG/DL
RBC # BLD: 3.08 M/UL — LOW (ref 3.8–5.2)
RBC # FLD: 26.5 % — HIGH (ref 10.3–14.5)
RBC BLD AUTO: ABNORMAL
RBC CASTS # UR COMP ASSIST: SIGNIFICANT CHANGE UP /HPF (ref 0–4)
SCHISTOCYTES BLD QL AUTO: SLIGHT — SIGNIFICANT CHANGE UP
SODIUM SERPL-SCNC: 141 MMOL/L — SIGNIFICANT CHANGE UP (ref 135–145)
SP GR SPEC: 1.02 — SIGNIFICANT CHANGE UP (ref 1.01–1.02)
TROPONIN T SERPL-MCNC: <0.01 NG/ML — SIGNIFICANT CHANGE UP (ref 0–0.06)
TSH SERPL-MCNC: 0.84 UIU/ML — SIGNIFICANT CHANGE UP (ref 0.27–4.2)
UROBILINOGEN FLD QL: NEGATIVE MG/DL — SIGNIFICANT CHANGE UP
VARIANT LYMPHS # BLD: 0.9 % — SIGNIFICANT CHANGE UP (ref 0–6)
WBC # BLD: 10.31 K/UL — SIGNIFICANT CHANGE UP (ref 3.8–10.5)
WBC # FLD AUTO: 10.31 K/UL — SIGNIFICANT CHANGE UP (ref 3.8–10.5)
WBC UR QL: SIGNIFICANT CHANGE UP

## 2020-07-29 PROCEDURE — 70450 CT HEAD/BRAIN W/O DYE: CPT | Mod: 26

## 2020-07-29 PROCEDURE — 93010 ELECTROCARDIOGRAM REPORT: CPT

## 2020-07-29 PROCEDURE — 71045 X-RAY EXAM CHEST 1 VIEW: CPT | Mod: 26

## 2020-07-29 PROCEDURE — 74176 CT ABD & PELVIS W/O CONTRAST: CPT | Mod: 26

## 2020-07-29 RX ORDER — SODIUM CHLORIDE 9 MG/ML
1000 INJECTION INTRAMUSCULAR; INTRAVENOUS; SUBCUTANEOUS ONCE
Refills: 0 | Status: COMPLETED | OUTPATIENT
Start: 2020-07-29 | End: 2020-07-29

## 2020-07-29 RX ADMIN — SODIUM CHLORIDE 1000 MILLILITER(S): 9 INJECTION INTRAMUSCULAR; INTRAVENOUS; SUBCUTANEOUS at 20:28

## 2020-07-29 RX ADMIN — SODIUM CHLORIDE 1000 MILLILITER(S): 9 INJECTION INTRAMUSCULAR; INTRAVENOUS; SUBCUTANEOUS at 21:26

## 2020-07-29 NOTE — ED ADULT NURSE NOTE - OBJECTIVE STATEMENT
Patient presents to ER for medical evaluation, as per family member patient was last seen well this AM, later on during the day, patient was found with AMS and sitting in own feces, patient does not recall episode, HX of dementia, afebrile, resp even/unlabored, lungs CTAB.

## 2020-07-29 NOTE — ED ADULT NURSE NOTE - NSIMPLEMENTINTERV_GEN_ALL_ED
Implemented All Universal Safety Interventions:  [place] to call system. Call bell, personal items and telephone within reach. Instruct patient to call for assistance. Room bathroom lighting operational. Non-slip footwear when patient is off stretcher. Physically safe environment: no spills, clutter or unnecessary equipment. Stretcher in lowest position, wheels locked, appropriate side rails in place.

## 2020-07-29 NOTE — ED PROVIDER NOTE - ATTENDING CONTRIBUTION TO CARE
The patient seen and examined    AMS    I, Alex Schlutz, performed the initial face to face bedside interview with this patient regarding history of present illness, review of symptoms and relevant past medical, social and family history.  I completed an independent physical examination.  I was the initial provider who evaluated this patient. I have signed out the follow up of any pending tests (i.e. labs, radiological studies) to the resident.  I have communicated the patient’s plan of care and disposition with the resident

## 2020-07-29 NOTE — H&P ADULT - HISTORY OF PRESENT ILLNESS
78 y/o female with h/o dementia , on no meds , lives alone , niece and nephew visit to check on her and today when niece Rosita went to her house pt. was noted to be covered with copious amount of stool , more confused than her baseline. pt. was brought to the ER via EMS. pt. did not have any BM/ Loose stools in the ER, has been quite alert and co-operative. no cp, no abd. pain, no n/v. no fever. no urine symptoms . denies any fall, no cough, no fever. As per niece pt. was not on any antibiotics recently, sometime gets food sent in by family from out side . 80 y/o female with h/o dementia , on no meds , lives alone , niece and nephew visit to check on her and today when niece Rosita went to her house pt. was noted to be covered with copious amount of stool , more confused than her baseline. pt. was brought to the ER via EMS. pt. did not have any BM/ Loose stools in the ER, has been quite alert and co-operative in the ER, likely at baseline ,  no cp, no abd. pain, no n/v. no fever. no urine symptoms . denies any fall, no cough, no fever. As per niece pt. was not on any antibiotics recently, sometime gets food sent in by family from out side . pt. got iv fluids 1L NS in the er and has been more alert and stable.

## 2020-07-29 NOTE — ED PROVIDER NOTE - CLINICAL SUMMARY MEDICAL DECISION MAKING FREE TEXT BOX
82y F w/ hx dementia, presenting for increased confusion over the past week.  Pt appears well, stable VS.  Will obtain CT head/abd/pelvis, EKG, CXR, labs, UA.  Treat with fluids and reassess.

## 2020-07-29 NOTE — H&P ADULT - ASSESSMENT
pt is admitted for     - AMS unspecified type, EMS notes indicated that pt's house was not suitable for current weather conditions , there was no AC or fan in the house, weather has been marcos very hot. This likely contributed to dehydration and confusion as it appears with iv fluids pt. has been quite alert and likely at her baseline. pt's u/a is not suggestive of uti but urine cx need to be followed. will give another 1 L of ns gently.  pt's brain ct scan with some artifact and obscuring posterior fossa findings and MRI shiv is recommended which has been ordered by ER physician to be followed. pt is admitted for     - AMS unspecified type, EMS notes indicated that pt's house was not suitable for current weather conditions , there was no AC or fan in the house, weather has been marcos very hot. This likely contributed to dehydration and confusion as it appears with iv fluids pt. has been quite alert and likely at her baseline. pt's u/a is not suggestive of uti but urine cx need to be followed. will give another 1 L of ns gently.  pt's brain ct scan with some artifact and obscuring posterior fossa findings and MRI shiv is recommended which has been ordered by ER physician,  to be followed.   pt. may need placement. one episode of bowl incontinence may be gastroenteritis ? will monitor closely.     - Dementia unspecified type without behavior disturbance.     - Left ankle pain unspecified chronicity. will get xray of left ankle, prn tylenol. pt is admitted for     - AMS unspecified type, EMS notes indicated that pt's house was not suitable for current weather conditions , there was no AC or fan in the house, weather has been marcos very hot. This likely contributed to dehydration and confusion as it appears with iv fluids pt. has been quite alert and likely at her baseline. pt's u/a is not suggestive of uti but urine cx need to be followed. will give another 1 L of ns gently.  pt's brain ct scan with some artifact and obscuring posterior fossa findings and MRI shiv is recommended which has been ordered by ER physician,  to be followed.   pt. may need placement. one episode of bowl incontinence may be gastroenteritis ? will monitor closely.     - Dementia unspecified type without behavior disturbance.     - Left ankle pain unspecified chronicity. will get xray of left ankle, prn tylenol.      - rt. renal cyst, 6.2 cm, prior history ? will request day team to consider urology opinion in am. pt is admitted for     - AMS unspecified type, EMS notes indicated that pt's house was not suitable for current weather conditions , there was no AC or fan in the house, weather has been marcos very hot. This likely contributed to dehydration and confusion as it appears with iv fluids pt. has been quite alert and likely at her baseline. pt's u/a is not suggestive of uti but urine cx need to be followed. will give another 1 L of ns gently.  will get TFT, vitamin b12, folate levels, start thiamine 100g po daily and MVI po daily, neuro consult on call requested, pt's brain ct scan with some artifact and obscuring posterior fossa findings and MRI shiv is recommended which has been ordered by ER physician,  to be followed.   pt. may need placement. one episode of bowl incontinence may be gastroenteritis ? will monitor closely.     - Dementia unspecified type without behavior disturbance.     - Left ankle pain unspecified chronicity. will get xray of left ankle, prn tylenol.      - rt. renal cyst, 6.2 cm, prior history ? will request day team to consider urology opinion in am.     - Anemia, mild anemia , basleine Hb ? will send anemia work p. pt is admitted for     - AMS unspecified type, EMS notes indicated that pt's house was not suitable for current weather conditions , there was no AC or fan in the house, weather has been marcos very hot. This likely contributed to dehydration and confusion as it appears with iv fluids pt. has been quite alert and likely at her baseline. pt's u/a is not suggestive of uti but urine cx need to be followed. will give another 1 L of ns gently.  will get TFT, vitamin b12, folate levels, start thiamine 100g po daily and MVI po daily, neuro consult on call requested, pt's brain ct scan with some artifact and obscuring posterior fossa findings and MRI shiv is recommended which has been ordered by ER physician,  to be followed.   pt. may need placement. one episode of bowl incontinence may be gastroenteritis ? will monitor closely.     - Dementia unspecified type without behavior disturbance.     - Left ankle pain unspecified chronicity. will get xray of left ankle, prn tylenol.      - Anemia, unspecified type, mild anemia , basleine Hb ? will send anemia work up.    - rt. renal cyst, 6.2 cm, prior history ? will request day team to consider urology opinion in am.

## 2020-07-29 NOTE — ED PROVIDER NOTE - OBJECTIVE STATEMENT
82y F w/ hx dementia, presenting for AMS.  Per niece/HCP Rosita ([phone]), pt has had worsening confusion over the past week.  Today, she found pt at home, covered in feces and acting more confused compared to her baseline.  Pt was also complaining of abdominal pain.  No fever, chills, cough, chest pain, shortness of breath, trauma.  Pt lives alone at home but is normally able to handle her basic needs, with family visiting frequently. 82y F w/ hx dementia, presenting for AMS.  Per niece/HCP Rosita ([phone]), pt has had worsening confusion over the past week.  Today, she found pt at home, covered in copious amount of feces and acting more confused compared to her baseline.  Stool described as soft/loose, nonbloody, with some mucus-like material.  Pt was also complaining of abdominal pain.  No fever, chills, cough, chest pain, shortness of breath, vomiting, trauma.  Pt lives alone at home but is normally able to handle her basic needs, with family visiting frequently.

## 2020-07-29 NOTE — H&P ADULT - NSHPPHYSICALEXAM_GEN_ALL_CORE
General:  female , small build lying in bed not in distress.  HEENT: AT, NC. PERRL. intact EOM. no throat erythema or exudate.   Neck: supple. no JVD.   Chest: CTA bilaterally  Heart: S1,S2. RRR. no heart murmur. no edema.   Abdomen: soft. non-tender. non-distended. + BS.   Ext: no calf tenderness. moves all ext. independently, left ankle with some swelling more over outer aspect and pt. feels pan to palpation over lower and outer portion of left ankle, pt's left ankle ROM is not significantly limited, no open wound over either ankles, no warmth.   Neuro: pt. is aware that she is in hospital like place, not sure about the year, when 3 different [place] president's names were given to pt. she knew that Arthur is current president. no focal weakness. no speech disorder. sensory / motor intact, Cns intact, follows commands appropriately.   Skin: mild abrasion over rt. knee over anterior aspect noted, no pallor, no diaphoresis.  Psych : co-operative, no agitation, no si/hi.

## 2020-07-29 NOTE — ED ADULT TRIAGE NOTE - CHIEF COMPLAINT QUOTE
hx of Alzheimer's. sent by family for worsening AMS over past week, found covered in feces by family. oriented to self only but normally doesn't know date /time.  in route

## 2020-07-29 NOTE — ED PROVIDER NOTE - NS ED ROS FT
Constitutional: no fever, no chills  Eyes: no vision changes  ENT: no nasal congestion, no sore throat  CV: no chest pain  Resp: no cough, no shortness of breath  GI: +abdominal pain, no vomiting, no diarrhea  : no dysuria  MSK: no joint pain  Skin: no rash  Neuro: no headache, no weakness, no paresthesias, +confusion

## 2020-07-29 NOTE — ED PROVIDER NOTE - PROGRESS NOTE DETAILS
Diagnostics reviewed.  No contraindication for MRI as per [facility] Rosita Rice.  Will admit.

## 2020-07-29 NOTE — ED PROVIDER NOTE - PHYSICAL EXAMINATION
Constitutional: Awake, alert, in no acute distress  Eyes: no scleral icterus  HENT: normocephalic, atraumatic, dry oral mucosa  CV: RRR, no murmur  Pulm: non-labored respirations, CTAB  Abdomen: soft, +mild suprapubic tenderness, non-distended, no CVAT  Extremities: no edema, no deformity  Skin: no rash, no jaundice  Neuro: AAOx1, moving all extremities equally

## 2020-07-29 NOTE — ED ADULT NURSE NOTE - NS TRANSFER PATIENT BELONGINGS
Podiatric [facility] Follow up    Assessment/Plan:    Bilateral venous stasis ulcerations and inflammation ( I87.333), left leg ulcer to the level of fat ( D40.504), Right leg ulcer to the level of fat (L97.912)    - Pt evaluated and treated. - Patient is currently in acute pain from the ulcerations as well as undergoing withdrawal from methadone.  - Advised to seek help from pain management. - Legs dressed with non adherent and DSD.  - Double tubgrip applied.  - Once pain management is on board, patient to return to wound care center for appropriate venous stasis ulceration management. - F/U as needed. Subjective:  Patient seen bedside. Moaning in pain. States the methadone clinic will not take him back. Unable to sleep. States he would like 2 layer of tubigrip. Negative for fever, chills, nausea, vomiting, chest pain, shortness of breath. 35year old male with PMH significant for CAD s/p stent placement and hx of smoking has B/L venous stasis ulcerations. Patient has not got any care for theses ulcerations prior to comes to the wound care center. Patient states he was on methadone for heroin addiction however has not been about to get to the clinic for the past 3 days because his truck broke down. Patient comes in today complaining of pain out of proportion 2/2 bilateral lower extremity ulcerations. States he went to the ED who did not admit the patient. States he did not have supplied and wrapped the legs with gauze that stuck to her legs which lead to more pain.         History:  Both leg wounds and ankles  Allergies   Allergen Reactions    Pcn [Penicillins] Swelling    Tylenol [Acetaminophen] Other (comments)     abd pain        Family History   Problem Relation Age of Onset    Family history unknown: Yes      Past Medical History:   Diagnosis Date    Ill-defined condition     venous stasis ulcers     Past Surgical History:   Procedure Laterality Date    HX CORONARY STENT PLACEMENT       Social History   Substance Use Topics    Smoking status: Current Every Day Smoker     Packs/day: 1.00    Smokeless tobacco: Never Used    Alcohol use No       History   Alcohol Use No     History   Drug Use No      History   Smoking Status    Current Every Day Smoker    Packs/day: 1.00   Smokeless Tobacco    Never Used     Current Outpatient Prescriptions   Medication Sig    methadone (DOLOPHINE) 10 mg tablet Take 130 mg by mouth every four (4) hours as needed for Pain.  naproxen sodium (ALEVE) 220 mg cap Take 8 Caps by mouth as needed.  ibuprofen (MOTRIN) 600 mg tablet Take 1 Tab by mouth every six (6) hours as needed for Pain.  HYDROcodone-acetaminophen (NORCO) 5-325 mg per tablet Take 1 Tab by mouth every four (4) hours as needed for Pain. No current facility-administered medications for this encounter. Objective:  Vitals: VSS, afebrile. Vascular:  B/L LE  DP 2/4; PT 2/4  capillary fill time brisk, pitting edema is present, skin temperature is cool, varicosities are about. Dermatological:  Nails are thickened, elongated, discolored, painful to palpation, 2mm thick, with subungual debris. There are extensive skin cracks at both heels. Skin is dry and scaly, exhibits hemosiderin deposition. There is no maceration of the interspaces of the feet b/l. Wound: 1  Location: Left leg circumferencial  Measurements: see note in [facility]  Margins: geographic  Drainage: serous  Odor: none  Wound base: fibrogranular  Lymphangitic streaking? No.  Undermining? No.  Sinus tracts? No.  Exposed bone? No.  Subcutaneous crepitation on palpation? No.    Wound:   Location: Right leg circumerencial  Measurements: see note in [facility]  Margins: geographic  Drainage: serous  Odor: none  Wound base: fibrogranular  Lymphangitic streaking? No.  Undermining? No.  Sinus tracts? No.  Exposed bone? No.  Subcutaneous crepitation on palpation? No.      Neurological:  DTR are present, protective sensation per 5.07 Twisp Raheem monofilament is intact, patient is AAOx3, mood is aggitated. Epicritic sensation is intact. Orthopedic:  B/L LE are symmetric, ROM of ankle, STJ, 1st MTPJ is limited, MMT 5 out of 5 for B/L LE. There has been no amputations    Constitutional: Pt is a well developed, middle aged average male. Lymphatics: negative tenderness to palpation of neck/axillary/inguinal nodes. [facility]  [facility], [facility] - Meghana PRINCE  [facility], [address] [facility], [address] [place] [address]. [address] [place], [facility] [address], [place], [address] [facility]  P: [phone]  F: [phone] Clothing

## 2020-07-30 DIAGNOSIS — Z90.710 ACQUIRED ABSENCE OF BOTH CERVIX AND UTERUS: Chronic | ICD-10-CM

## 2020-07-30 LAB
ANION GAP SERPL CALC-SCNC: 14 MMOL/L — SIGNIFICANT CHANGE UP (ref 5–17)
APTT BLD: 27 SEC — LOW (ref 27.5–35.5)
BUN SERPL-MCNC: 13 MG/DL — SIGNIFICANT CHANGE UP (ref 8–20)
CALCIUM SERPL-MCNC: 8.8 MG/DL — SIGNIFICANT CHANGE UP (ref 8.6–10.2)
CHLORIDE SERPL-SCNC: 107 MMOL/L — SIGNIFICANT CHANGE UP (ref 98–107)
CO2 SERPL-SCNC: 21 MMOL/L — LOW (ref 22–29)
CREAT SERPL-MCNC: 0.79 MG/DL — SIGNIFICANT CHANGE UP (ref 0.5–1.3)
FERRITIN SERPL-MCNC: 549 NG/ML — HIGH (ref 15–150)
FOLATE SERPL-MCNC: 13.4 NG/ML — SIGNIFICANT CHANGE UP
GLUCOSE SERPL-MCNC: 106 MG/DL — HIGH (ref 70–99)
INR BLD: 1.19 RATIO — HIGH (ref 0.88–1.16)
IRON SATN MFR SERPL: 152 UG/DL — HIGH (ref 37–145)
IRON SATN MFR SERPL: 77 % — HIGH (ref 14–50)
POTASSIUM SERPL-MCNC: 3.4 MMOL/L — LOW (ref 3.5–5.3)
POTASSIUM SERPL-SCNC: 3.4 MMOL/L — LOW (ref 3.5–5.3)
PROTHROM AB SERPL-ACNC: 13.7 SEC — HIGH (ref 10.6–13.6)
RBC # BLD: 2.79 M/UL — LOW (ref 3.8–5.2)
RETICS #: 17 K/UL — LOW (ref 25–125)
RETICS/RBC NFR: 0.6 % — SIGNIFICANT CHANGE UP (ref 0.5–2.5)
SARS-COV-2 RNA SPEC QL NAA+PROBE: SIGNIFICANT CHANGE UP
SODIUM SERPL-SCNC: 142 MMOL/L — SIGNIFICANT CHANGE UP (ref 135–145)
T PALLIDUM AB TITR SER: NEGATIVE — SIGNIFICANT CHANGE UP
T3 SERPL-MCNC: 52 NG/DL — LOW (ref 80–200)
T4 AB SER-ACNC: 6.3 UG/DL — SIGNIFICANT CHANGE UP (ref 4.5–12)
TIBC SERPL-MCNC: 197 UG/DL — LOW (ref 220–430)
TRANSFERRIN SERPL-MCNC: 138 MG/DL — LOW (ref 192–382)
TSH SERPL-MCNC: 4.18 UIU/ML — SIGNIFICANT CHANGE UP (ref 0.27–4.2)
VIT B12 SERPL-MCNC: 621 PG/ML — SIGNIFICANT CHANGE UP (ref 232–1245)

## 2020-07-30 PROCEDURE — 73610 X-RAY EXAM OF ANKLE: CPT | Mod: 26,LT

## 2020-07-30 PROCEDURE — 99233 SBSQ HOSP IP/OBS HIGH 50: CPT

## 2020-07-30 PROCEDURE — 99497 ADVNCD CARE PLAN 30 MIN: CPT

## 2020-07-30 RX ORDER — ACETAMINOPHEN 500 MG
650 TABLET ORAL EVERY 6 HOURS
Refills: 0 | Status: DISCONTINUED | OUTPATIENT
Start: 2020-07-30 | End: 2020-08-06

## 2020-07-30 RX ORDER — THIAMINE MONONITRATE (VIT B1) 100 MG
100 TABLET ORAL DAILY
Refills: 0 | Status: DISCONTINUED | OUTPATIENT
Start: 2020-07-30 | End: 2020-08-06

## 2020-07-30 RX ORDER — HEPARIN SODIUM 5000 [USP'U]/ML
5000 INJECTION INTRAVENOUS; SUBCUTANEOUS EVERY 12 HOURS
Refills: 0 | Status: DISCONTINUED | OUTPATIENT
Start: 2020-07-30 | End: 2020-08-06

## 2020-07-30 RX ORDER — HALOPERIDOL DECANOATE 100 MG/ML
1 INJECTION INTRAMUSCULAR ONCE
Refills: 0 | Status: COMPLETED | OUTPATIENT
Start: 2020-07-30 | End: 2020-07-30

## 2020-07-30 RX ORDER — ALBUTEROL 90 UG/1
2 AEROSOL, METERED ORAL EVERY 6 HOURS
Refills: 0 | Status: DISCONTINUED | OUTPATIENT
Start: 2020-07-30 | End: 2020-08-06

## 2020-07-30 RX ORDER — DONEPEZIL HYDROCHLORIDE 10 MG/1
5 TABLET, FILM COATED ORAL AT BEDTIME
Refills: 0 | Status: DISCONTINUED | OUTPATIENT
Start: 2020-07-30 | End: 2020-08-06

## 2020-07-30 RX ORDER — SODIUM CHLORIDE 9 MG/ML
1000 INJECTION INTRAMUSCULAR; INTRAVENOUS; SUBCUTANEOUS
Refills: 0 | Status: COMPLETED | OUTPATIENT
Start: 2020-07-30 | End: 2020-07-30

## 2020-07-30 RX ORDER — POTASSIUM CHLORIDE 20 MEQ
20 PACKET (EA) ORAL ONCE
Refills: 0 | Status: COMPLETED | OUTPATIENT
Start: 2020-07-30 | End: 2020-07-30

## 2020-07-30 RX ADMIN — Medication 1 TABLET(S): at 08:01

## 2020-07-30 RX ADMIN — HEPARIN SODIUM 5000 UNIT(S): 5000 INJECTION INTRAVENOUS; SUBCUTANEOUS at 12:36

## 2020-07-30 RX ADMIN — Medication 650 MILLIGRAM(S): at 05:12

## 2020-07-30 RX ADMIN — HALOPERIDOL DECANOATE 1 MILLIGRAM(S): 100 INJECTION INTRAMUSCULAR at 01:36

## 2020-07-30 RX ADMIN — HALOPERIDOL DECANOATE 1 MILLIGRAM(S): 100 INJECTION INTRAMUSCULAR at 02:35

## 2020-07-30 RX ADMIN — Medication 100 MILLIGRAM(S): at 08:01

## 2020-07-30 RX ADMIN — Medication 650 MILLIGRAM(S): at 05:43

## 2020-07-30 RX ADMIN — SODIUM CHLORIDE 75 MILLILITER(S): 9 INJECTION INTRAMUSCULAR; INTRAVENOUS; SUBCUTANEOUS at 00:26

## 2020-07-30 RX ADMIN — DONEPEZIL HYDROCHLORIDE 5 MILLIGRAM(S): 10 TABLET, FILM COATED ORAL at 21:57

## 2020-07-30 RX ADMIN — Medication 20 MILLIEQUIVALENT(S): at 12:36

## 2020-07-30 NOTE — PROGRESS NOTE ADULT - ASSESSMENT
78 y/o female with PMH of dementia not on any medications, lives alone with periodically visits from niece and nephew, On 7/29/20 Niece Rosita came to visit to check on her and when entered home pt was noted to be covered with copious amount of stool, very confused, which is not her baseline. Pt was brought to the ER via EMS, and did not have any BM/ Loose stools in the ER, and had been quite alert and co-operative in the ER, likely at baseline.  Pt had no complaints of chest pain, abdominal pain, N/V, fever or urine symptoms. Pt also denies any fall/trauma, cough, or fever.  Per niece, pt was not recently on antibiotic,  sometimes has food brought by family.  Pt was admitted for further evaluation of AMS.    1)AMS  -Likely 2/2 to dehydration and heat wave. Per records EMS noted that pt's house was not suitable for current weather conditions , there was no AC or fan in the house, weather has been very hot. This likely contributed to dehydration and confusion given pt's mental status improved with IVFs.   -CT brain showed extensive chronic ischemic changes in both hemispheres as well as within the posterior fossa. recommending a follow up MRI of brain  -MRI pending  -Neuro consult appreciated. Per recs start Donepezil 5mg daily.  -Social work consulted, pt may need placement following discharge.      2)dementia  -Neuro consult appreciated.   -Continue Donepezil 5 mg daily    3)Left Ankle Pain  -Likely chronic, ambulating with no issue  -X-Ray of ankle negative for fracture  -Tylenol PRN pain    4)Anemia, unspecified type, mild anemia   -.2, Total Iron-152, TIBC-197, iron Saturation 77, Ferritin, 549  -TSH wnl, T3-52, Vit , folate 13.4  -Continue to trend H/H  -Continue Thiamine, and Multivitamins daily    5)Right Renal Cyst  -Right renal cyst, measuring 6.2 cm.  -Will continue to monitor, F/u Outpt recommended    DISPO: Pending clinical improvement and social work recs as pt may require placement. 80 y/o female with PMH of dementia not on any medications, lives alone with periodically visits from niece and nephew, On 7/29/20 Niece Rosita came to visit to check on her and when entered home pt was noted to be covered with copious amount of stool, very confused, which is not her baseline. Pt was brought to the ER via EMS, and did not have any BM/ Loose stools in the ER, and had been quite alert and co-operative in the ER, likely at baseline.  Pt had no complaints of chest pain, abdominal pain, N/V, fever or urine symptoms. Pt also denies any fall/trauma, cough, or fever.  Per niece, pt was not recently on antibiotic,  sometimes has food brought by family.  Pt was admitted for further evaluation of AMS.    1)AMS  -Likely 2/2 to dehydration and heat wave. Per records EMS noted that pt's house was not suitable for current weather conditions , there was no AC or fan in the house, weather has been very hot. This likely contributed to dehydration and confusion given pt's mental status improved with IVFs.   -CT brain showed extensive chronic ischemic changes in both hemispheres as well as within the posterior fossa. recommending a follow up MRI of brain  -MRI pending  -Neuro consult appreciated. Per recs start Donepezil 5mg daily.  -Social work consulted, pt may need placement following discharge.    2)Hypokalemia  -Noted to be 3.4 today, downtrending from 4.4 yesterday 7/29, 1 dose of 20 mEq PO given today.  -Will repeat BMP in AM  -Trend potassium, Recheck Mg    3)dementia  -Neuro consult appreciated.   -Continue Donepezil 5 mg daily    4)Left Ankle Pain  -Likely chronic, ambulating with no issue  -X-Ray of ankle negative for fracture  -Tylenol PRN pain    5)Anemia, unspecified type, mild anemia   -.2, Total Iron-152, TIBC-197, iron Saturation 77, Ferritin, 549  -TSH wnl, T3-52, Vit , folate 13.4  -Continue to trend H/H  -Continue Thiamine, and Multivitamins daily    6)Right Renal Cyst  -Right renal cyst, measuring 6.2 cm.  -Will continue to monitor, F/u Outpt recommended    DISPO: Pending clinical improvement and social work recs as pt may require placement. 80 y/o female with PMH of dementia not on any medications, lives alone with periodically visits from niece and nephew, On 7/29/20 Niece Rosita came to visit to check on her and when entered home pt was noted to be covered with copious amount of stool, very confused, which is not her baseline. Pt was brought to the ER via EMS, and did not have any BM/ Loose stools in the ER, and had been quite alert and co-operative in the ER, likely at baseline.  Pt had no complaints of chest pain, abdominal pain, N/V, fever or urine symptoms. Pt also denies any fall/trauma, cough, or fever.  Per niece, pt was not recently on antibiotic,  sometimes has food brought by family.  Pt was admitted for further evaluation of AMS.    1)AMS  -Likely 2/2 to dehydration.  Per records EMS noted that pt's house was not suitable for current weather conditions , there was no AC or fan in the house, weather has been very hot. This likely contributed to dehydration and confusion given pt's mental status improved with IVFs.   -CT brain showed extensive chronic ischemic changes in both hemispheres as well as within the posterior fossa. recommending a follow up MRI of brain  -MRI pending  -Neuro consult appreciated. Per recs start Donepezil 5mg daily.  -Social work consulted, pt may need placement following discharge.    2)Hypokalemia  -Noted to be 3.4 today, downtrending from 4.4 yesterday 7/29, 1 dose of 20 mEq PO given today.  -Will repeat BMP in AM  -Trend potassium, Recheck Mg    3)dementia  -Neuro consult appreciated.   -Continue Donepezil 5 mg daily    4)Left Ankle Pain  -Likely chronic, ambulating with no issue  -X-Ray of ankle negative for fracture  -Tylenol PRN pain    5)Anemia, unspecified type, mild anemia   -.2, Total Iron-152, TIBC-197, iron Saturation 77, Ferritin, 549  -TSH wnl, T3-52, Vit , folate 13.4  -Continue to trend H/H  -Continue Thiamine, and Multivitamins daily    6)Right Renal Cyst  -Right renal cyst, measuring 6.2 cm.  -Will continue to monitor, F/u Outpt recommended    DISPO: Pending clinical improvement and social work recs as pt may require placement.

## 2020-07-30 NOTE — PHYSICAL THERAPY INITIAL EVALUATION ADULT - ADDITIONAL COMMENTS
pt a poor historian, as per medical chart pt lives alone, pt states owns no DME and has no stairs to negotiate at home

## 2020-07-30 NOTE — ED ADULT NURSE REASSESSMENT NOTE - NS ED NURSE REASSESS COMMENT FT1
Dr Garcia at the bedside assessing and admitting patient, patient resting in bed comfortably, cardiac monitor in progress, resp even/unlabored, VS stable, IV fluids infusing well, will continue to monitor patient.

## 2020-07-30 NOTE — PHYSICAL THERAPY INITIAL EVALUATION ADULT - CRITERIA FOR SKILLED THERAPEUTIC INTERVENTIONS
impairments found/therapy frequency/anticipated discharge recommendation/functional limitations in following categories/predicted duration of therapy intervention/rehab potential

## 2020-07-30 NOTE — CONSULT NOTE ADULT - SUBJECTIVE AND OBJECTIVE BOX
CHIEF COMPLAINT:    HPI: 79yFemale  80 y/o female with h/o dementia , on no meds , lives alone , niece and nephew visit to check on her and today when niece Rosita went to her house pt. was noted to be covered with copious amount of stool , more confused than her baseline. pt. was brought to the ER via EMS. pt. did not have any BM/ Loose stools in the ER, has been quite alert and co-operative in the ER, likely at baseline ,  no cp, no abd. pain, no n/v. no fever. no urine symptoms . denies any fall, no cough, no fever. As per niece pt. was not on any antibiotics recently, sometime gets food sent in by family from out side . pt. got iv fluids 1L NS in the er and has been more alert and stable.     PAST MEDICAL & SURGICAL HISTORY:  Dementia  S/P hysterectomy    MEDICATIONS  (STANDING):  heparin   Injectable 5000 Unit(s) SubCutaneous every 12 hours  multivitamin 1 Tablet(s) Oral daily  thiamine 100 milliGRAM(s) Oral daily    MEDICATIONS  (PRN):  acetaminophen   Tablet .. 650 milliGRAM(s) Oral every 6 hours PRN Mild Pain (1 - 3), Moderate Pain (4 - 6)  ALBUTerol    90 MICROgram(s) HFA Inhaler 2 Puff(s) Inhalation every 6 hours PRN Shortness of Breath and/or Wheezing    Allergies    No Known Allergies    Intolerances        FAMILY HISTORY:  FH: stroke: father  FH: HTN (hypertension)          SOCIAL HISTORY:    Tobacco:  no  Alcohol:  ni  Drugs:  no        REVIEW OF SYSTEMS:    Relevant systems are negative except as noted in the chart, HPI, and PMH      VITAL SIGNS:  Vital Signs Last 24 Hrs  T(C): 36.6 (30 Jul 2020 07:53), Max: 37.2 (29 Jul 2020 17:47)  T(F): 97.8 (30 Jul 2020 07:53), Max: 98.9 (29 Jul 2020 17:47)  HR: 70 (30 Jul 2020 07:53) (60 - 92)  BP: 145/51 (30 Jul 2020 07:53) (131/66 - 155/65)  BP(mean): 82 (30 Jul 2020 07:53) (82 - 82)  RR: 17 (30 Jul 2020 07:53) (16 - 18)  SpO2: 100% (30 Jul 2020 07:53) (95% - 100%)    PHYSICAL EXAMINATION:    General: Well-developed, well nourished, in no acute distress.  Cardiac:  Regular rate and rhythm. No carotid bruits appreciated.  Eyes: Fundoscopic examination was deferred.  Neurologic:  - Mental Status:  Alert, awake, oriented to person, place, and time; Speech is fluent. Language is normal. Follows commands well.  Insight and knowledge appear appropriate.  Cranial Nerves II-XII:    II:  Visual acuity is normal for age ; Visual fields are full to confrontation; Pupils are equal, round, and reactive to light.  III, IV, VI:  Extraocular movements are intact without nystagmus.  V:  Facial sensation is intact in the V1-V3 distribution bilaterally.  VII:  Face is symmetric with normal eye closure and smile  VIII:  Hearing is grossly intact  IX, X, XII:  speech is clear  XI:  Head turning and shoulder shrug are intact.  - Motor:  Strength is 5/5 x 4.   There is no pronator drift. .  - Reflexes:  2+ and symmetric at the knees.  Plantar responses flexor.  - Sensory:  Symmetric to light touch  - Coordination:  Finger-nose-finger is normal. Rapid alternating hand and foot  movements are intact. Dexterity appears normal      LABS:                          10.7   10.31 )-----------( 506      ( 29 Jul 2020 18:53 )             32.4     30 Jul 2020 07:36    142    |  107    |  13.0   ----------------------------<  106    3.4     |  21.0   |  0.79     Ca    8.8        30 Jul 2020 07:36  Mg     2.1       29 Jul 2020 18:53    TPro  7.1    /  Alb  4.5    /  TBili  0.8    /  DBili  x      /  AST  19     /  ALT  6      /  AlkPhos  51     29 Jul 2020 18:53    LIVER FUNCTIONS - ( 29 Jul 2020 18:53 )  Alb: 4.5 g/dL / Pro: 7.1 g/dL / ALK PHOS: 51 U/L / ALT: 6 U/L / AST: 19 U/L / GGT: x           PT/INR - ( 30 Jul 2020 07:36 )   PT: 13.7 sec;   INR: 1.19 ratio         PTT - ( 30 Jul 2020 07:36 )  PTT:27.0 sec      RADIOLOGY & ADDITIONAL STUDIES:    < from: CT Head No Cont (07.29.20 @ 19:14) >  FINDINGS:    HEMISPHERES:  Extensive chronic small vessel ischemic changes are noted in the basal ganglia and white matter of both hemispheres with volume loss. No acute infarct or hemorrhage is suggested.  VENTRICLES:  Ex vacuo enlargement is noted  POSTERIOR FOSSA:  Chronic ischemic changes are noted in the brainstem. A superimposed acute brainstem infarct cannot be excluded.  EXTRACEREBRAL SPACES:  No subdural or epidural collections are noted.  SKULL BASE AND CALVARIUM:  Appears intact.  No fracture or destructive lesion is identified.  SINUSES AND MASTOIDS:  Clear.  MISCELLANEOUS:  No orbital or suprasellar abnormality noted.    IMPRESSION:  1)  extensive chronic ischemic changes in both hemispheres as well as within the posterior fossa.  2)  follow-up MR imaging of the brain is recommended to rule out the possibility of a brain stem infarct, as there are artifacts which obscure detail in theposterior fossa.        < end of copied text >    IMPRESSION:    Vascular dementia  r/o acute intercurrent illness    PLAN:  1. MR brain  2.  labs- B12, TSH, RPR, etc  3.  Medical evaluation and treatment as indicated  4.  5. CHIEF COMPLAINT:    HPI: 79yFemale  80 y/o female with h/o dementia , on no meds , lives alone , niece and nephew visit to check on her and today when niece Rosita went to her house pt. was noted to be covered with copious amount of stool , more confused than her baseline. pt. was brought to the ER via EMS. pt. did not have any BM/ Loose stools in the ER, has been quite alert and co-operative in the ER, likely at baseline ,  no cp, no abd. pain, no n/v. no fever. no urine symptoms . denies any fall, no cough, no fever. As per niece pt. was not on any antibiotics recently, sometime gets food sent in by family from out side . pt. got iv fluids 1L NS in the er and has been more alert and stable.     PAST MEDICAL & SURGICAL HISTORY:  Dementia  S/P hysterectomy    MEDICATIONS  (STANDING):  heparin   Injectable 5000 Unit(s) SubCutaneous every 12 hours  multivitamin 1 Tablet(s) Oral daily  thiamine 100 milliGRAM(s) Oral daily    MEDICATIONS  (PRN):  acetaminophen   Tablet .. 650 milliGRAM(s) Oral every 6 hours PRN Mild Pain (1 - 3), Moderate Pain (4 - 6)  ALBUTerol    90 MICROgram(s) HFA Inhaler 2 Puff(s) Inhalation every 6 hours PRN Shortness of Breath and/or Wheezing    Allergies    No Known Allergies    Intolerances        FAMILY HISTORY:  FH: stroke: father  FH: HTN (hypertension)          SOCIAL HISTORY:    Tobacco:  no  Alcohol:  no  Drugs:  no        REVIEW OF SYSTEMS:    Relevant systems are negative except as noted in the chart, HPI, and PMH      VITAL SIGNS:  Vital Signs Last 24 Hrs  T(C): 36.6 (30 Jul 2020 07:53), Max: 37.2 (29 Jul 2020 17:47)  T(F): 97.8 (30 Jul 2020 07:53), Max: 98.9 (29 Jul 2020 17:47)  HR: 70 (30 Jul 2020 07:53) (60 - 92)  BP: 145/51 (30 Jul 2020 07:53) (131/66 - 155/65)  BP(mean): 82 (30 Jul 2020 07:53) (82 - 82)  RR: 17 (30 Jul 2020 07:53) (16 - 18)  SpO2: 100% (30 Jul 2020 07:53) (95% - 100%)    PHYSICAL EXAMINATION:    General: Thin almost frail , in no acute distress.  Cardiac:  Regular rate and rhythm. No carotid bruits appreciated.  Eyes: Fundoscopic examination was deferred.  Neurologic:  - Mental Status:  Alert, awake, oriented to person only . cant tell me her age . ; Speech is fluent. Language is normal. Follows commands well.  Insight and knowledge- markedly diminshed  Cranial Nerves II-XII:    II:  Visual acuity is normal for age ; Visual fields are full to confrontation; Pupils are equal, round, and reactive to light.  III, IV, VI:  Extraocular movements are intact without nystagmus.  V:  Facial sensation is intact in the V1-V3 distribution bilaterally.  VII:  Face is symmetric with normal eye closure and smile  VIII:  Hearing is grossly intact  IX, X, XII:  speech is clear  XI:  Head turning and shoulder shrug are intact.  - Motor:  Strength is 5/5 x 4.   There is no pronator drift. .  - Reflexes:  2+ and symmetric at the knees.  Plantar responses flexor.  - Sensory:  Symmetric to light touch  - Coordination:  Finger-nose-finger is normal. Rapid alternating hand and foot  movements are intact. Dexterity appears normal      LABS:                          10.7   10.31 )-----------( 506      ( 29 Jul 2020 18:53 )             32.4     30 Jul 2020 07:36    142    |  107    |  13.0   ----------------------------<  106    3.4     |  21.0   |  0.79     Ca    8.8        30 Jul 2020 07:36  Mg     2.1       29 Jul 2020 18:53    TPro  7.1    /  Alb  4.5    /  TBili  0.8    /  DBili  x      /  AST  19     /  ALT  6      /  AlkPhos  51     29 Jul 2020 18:53    LIVER FUNCTIONS - ( 29 Jul 2020 18:53 )  Alb: 4.5 g/dL / Pro: 7.1 g/dL / ALK PHOS: 51 U/L / ALT: 6 U/L / AST: 19 U/L / GGT: x           PT/INR - ( 30 Jul 2020 07:36 )   PT: 13.7 sec;   INR: 1.19 ratio         PTT - ( 30 Jul 2020 07:36 )  PTT:27.0 sec      RADIOLOGY & ADDITIONAL STUDIES:    < from: CT Head No Cont (07.29.20 @ 19:14) >  FINDINGS:    HEMISPHERES:  Extensive chronic small vessel ischemic changes are noted in the basal ganglia and white matter of both hemispheres with volume loss. No acute infarct or hemorrhage is suggested.  VENTRICLES:  Ex vacuo enlargement is noted  POSTERIOR FOSSA:  Chronic ischemic changes are noted in the brainstem. A superimposed acute brainstem infarct cannot be excluded.  EXTRACEREBRAL SPACES:  No subdural or epidural collections are noted.  SKULL BASE AND CALVARIUM:  Appears intact.  No fracture or destructive lesion is identified.  SINUSES AND MASTOIDS:  Clear.  MISCELLANEOUS:  No orbital or suprasellar abnormality noted.    IMPRESSION:  1)  extensive chronic ischemic changes in both hemispheres as well as within the posterior fossa.  2)  follow-up MR imaging of the brain is recommended to rule out the possibility of a brain stem infarct, as there are artifacts which obscure detail in theposterior fossa.        < end of copied text >    IMPRESSION:    Vascular dementia  r/o acute intercurrent illness    PLAN:  1. MR brain  2.  labs- B12, TSH, RPR, etc  3.  Medical evaluation and treatment as indicated  4.   5. Donepezil 5 mg daily  5.

## 2020-07-30 NOTE — PROGRESS NOTE ADULT - ATTENDING COMMENTS
I have personally seen, examined and participated in the care of this patient. I have reviewed all pertinent clinical information, including history, physical exam, plan and agree with the above.   A&O x1 at time of exam. Orientation waxes/wanes. As per d/w Rosita elkins (HCP), pt is DNR/DNI. MOLST completed. Reports pt has been mostly independent at home. Herself and pt's nephew visit and bring groceries. Reports pt has been continent to urine and bowels until yesterday when found at home. Says pt's confusion varies and pt has difficulty "finding words" but overall conversive and lucid.

## 2020-07-30 NOTE — GOALS OF CARE CONVERSATION - ADVANCED CARE PLANNING - CONVERSATION DETAILS
D/w pt's niece, Rosita, who confirms she is HCP. Reports pt is DNR and DNI. To bring in HCP paperwork. Verbal consent given  for MOLST completion

## 2020-07-31 LAB
ANION GAP SERPL CALC-SCNC: 14 MMOL/L — SIGNIFICANT CHANGE UP (ref 5–17)
BASOPHILS # BLD AUTO: 0.09 K/UL — SIGNIFICANT CHANGE UP (ref 0–0.2)
BASOPHILS NFR BLD AUTO: 0.9 % — SIGNIFICANT CHANGE UP (ref 0–2)
BUN SERPL-MCNC: 9 MG/DL — SIGNIFICANT CHANGE UP (ref 8–20)
CALCIUM SERPL-MCNC: 9.4 MG/DL — SIGNIFICANT CHANGE UP (ref 8.6–10.2)
CHLORIDE SERPL-SCNC: 103 MMOL/L — SIGNIFICANT CHANGE UP (ref 98–107)
CO2 SERPL-SCNC: 24 MMOL/L — SIGNIFICANT CHANGE UP (ref 22–29)
CREAT SERPL-MCNC: 0.82 MG/DL — SIGNIFICANT CHANGE UP (ref 0.5–1.3)
CULTURE RESULTS: SIGNIFICANT CHANGE UP
EOSINOPHIL # BLD AUTO: 0.02 K/UL — SIGNIFICANT CHANGE UP (ref 0–0.5)
EOSINOPHIL NFR BLD AUTO: 0.2 % — SIGNIFICANT CHANGE UP (ref 0–6)
GLUCOSE SERPL-MCNC: 109 MG/DL — HIGH (ref 70–99)
HCT VFR BLD CALC: 30.5 % — LOW (ref 34.5–45)
HGB BLD-MCNC: 10.2 G/DL — LOW (ref 11.5–15.5)
IMM GRANULOCYTES NFR BLD AUTO: 0.4 % — SIGNIFICANT CHANGE UP (ref 0–1.5)
LYMPHOCYTES # BLD AUTO: 1.04 K/UL — SIGNIFICANT CHANGE UP (ref 1–3.3)
LYMPHOCYTES # BLD AUTO: 10.5 % — LOW (ref 13–44)
MAGNESIUM SERPL-MCNC: 1.9 MG/DL — SIGNIFICANT CHANGE UP (ref 1.6–2.6)
MCHC RBC-ENTMCNC: 33.4 GM/DL — SIGNIFICANT CHANGE UP (ref 32–36)
MCHC RBC-ENTMCNC: 34.1 PG — HIGH (ref 27–34)
MCV RBC AUTO: 102 FL — HIGH (ref 80–100)
MONOCYTES # BLD AUTO: 0.56 K/UL — SIGNIFICANT CHANGE UP (ref 0–0.9)
MONOCYTES NFR BLD AUTO: 5.6 % — SIGNIFICANT CHANGE UP (ref 2–14)
NEUTROPHILS # BLD AUTO: 8.2 K/UL — HIGH (ref 1.8–7.4)
NEUTROPHILS NFR BLD AUTO: 82.4 % — HIGH (ref 43–77)
PLATELET # BLD AUTO: 495 K/UL — HIGH (ref 150–400)
POTASSIUM SERPL-MCNC: 3.5 MMOL/L — SIGNIFICANT CHANGE UP (ref 3.5–5.3)
POTASSIUM SERPL-SCNC: 3.5 MMOL/L — SIGNIFICANT CHANGE UP (ref 3.5–5.3)
RBC # BLD: 2.99 M/UL — LOW (ref 3.8–5.2)
RBC # FLD: 24.8 % — HIGH (ref 10.3–14.5)
SARS-COV-2 IGG SERPL QL IA: NEGATIVE — SIGNIFICANT CHANGE UP
SARS-COV-2 IGM SERPL IA-ACNC: <0.1 INDEX — SIGNIFICANT CHANGE UP
SODIUM SERPL-SCNC: 141 MMOL/L — SIGNIFICANT CHANGE UP (ref 135–145)
SPECIMEN SOURCE: SIGNIFICANT CHANGE UP
WBC # BLD: 9.95 K/UL — SIGNIFICANT CHANGE UP (ref 3.8–10.5)
WBC # FLD AUTO: 9.95 K/UL — SIGNIFICANT CHANGE UP (ref 3.8–10.5)

## 2020-07-31 PROCEDURE — 99232 SBSQ HOSP IP/OBS MODERATE 35: CPT

## 2020-07-31 RX ORDER — LANOLIN ALCOHOL/MO/W.PET/CERES
5 CREAM (GRAM) TOPICAL AT BEDTIME
Refills: 0 | Status: DISCONTINUED | OUTPATIENT
Start: 2020-07-31 | End: 2020-08-06

## 2020-07-31 RX ORDER — MAGNESIUM OXIDE 400 MG ORAL TABLET 241.3 MG
400 TABLET ORAL
Refills: 0 | Status: COMPLETED | OUTPATIENT
Start: 2020-07-31 | End: 2020-08-01

## 2020-07-31 RX ADMIN — HEPARIN SODIUM 5000 UNIT(S): 5000 INJECTION INTRAVENOUS; SUBCUTANEOUS at 05:27

## 2020-07-31 RX ADMIN — MAGNESIUM OXIDE 400 MG ORAL TABLET 400 MILLIGRAM(S): 241.3 TABLET ORAL at 13:19

## 2020-07-31 RX ADMIN — Medication 1 TABLET(S): at 11:10

## 2020-07-31 RX ADMIN — MAGNESIUM OXIDE 400 MG ORAL TABLET 400 MILLIGRAM(S): 241.3 TABLET ORAL at 17:40

## 2020-07-31 RX ADMIN — Medication 100 MILLIGRAM(S): at 11:09

## 2020-07-31 RX ADMIN — Medication 5 MILLIGRAM(S): at 21:24

## 2020-07-31 RX ADMIN — DONEPEZIL HYDROCHLORIDE 5 MILLIGRAM(S): 10 TABLET, FILM COATED ORAL at 21:24

## 2020-07-31 RX ADMIN — HEPARIN SODIUM 5000 UNIT(S): 5000 INJECTION INTRAVENOUS; SUBCUTANEOUS at 17:38

## 2020-07-31 NOTE — PROGRESS NOTE ADULT - ASSESSMENT
78 y/o female with PMH of dementia not on any medications, lives alone with periodically visits from niece and nephew, On 7/29/20 Niece Rosita came to visit to check on her and when entered home pt was noted to be covered with copious amount of stool, very confused, which is not her baseline. Pt was brought to the ER via EMS, and did not have any BM/ Loose stools in the ER, and had been quite alert and co-operative in the ER, likely at baseline.  Pt had no complaints of chest pain, abdominal pain, N/V, fever or urine symptoms. Pt also denies any fall/trauma, cough, or fever.  Per niece, pt was not recently on antibiotic,  sometimes has food brought by family.  Pt was admitted for further evaluation of AMS.    AMS  -Likely 2/2 to dehydration.  Per records EMS noted that pt's house was not suitable for current weather conditions , there was no AC or fan in the house, weather has been very hot. This likely contributed to dehydration and confusion given pt's mental status improved with IVFs.   -seems to be at baseline now  -CT brain showed extensive chronic ischemic changes in both hemispheres as well as within the posterior fossa. recommending a follow up MRI of brain  -MRI pending  -Neuro consult appreciated. Per recs start Donepezil 5mg daily.  -Social work consulted, pt for placement     Hypokalemia  -improved  -Will repeat BMP in AM  -Trend potassium, Mg    Dementia  -Neuro consult appreciated.   -Continue Donepezil 5 mg daily    Left Ankle Pain  -Likely chronic, ambulating with no issue  -X-Ray of ankle negative for fracture  -Tylenol PRN pain    Anemia, unspecified type, mild anemia   -.2, Total Iron-152, TIBC-197, iron Saturation 77, Ferritin, 549  -TSH wnl, T3-52, Vit , folate 13.4  -Continue to trend H/H  -Continue Thiamine, and Multivitamins daily    Right Renal Cyst  -Right renal cyst, measuring 6.2 cm.  -Will continue to monitor, F/u Outpt recommended    DISPO: Pending MRI head. Will need placement following hospital due to progressing dementia

## 2020-08-01 PROCEDURE — 70551 MRI BRAIN STEM W/O DYE: CPT | Mod: 26

## 2020-08-01 PROCEDURE — 99232 SBSQ HOSP IP/OBS MODERATE 35: CPT

## 2020-08-01 RX ADMIN — HEPARIN SODIUM 5000 UNIT(S): 5000 INJECTION INTRAVENOUS; SUBCUTANEOUS at 05:17

## 2020-08-01 RX ADMIN — Medication 5 MILLIGRAM(S): at 21:33

## 2020-08-01 RX ADMIN — Medication 1 TABLET(S): at 08:36

## 2020-08-01 RX ADMIN — HEPARIN SODIUM 5000 UNIT(S): 5000 INJECTION INTRAVENOUS; SUBCUTANEOUS at 17:12

## 2020-08-01 RX ADMIN — Medication 100 MILLIGRAM(S): at 08:36

## 2020-08-01 RX ADMIN — MAGNESIUM OXIDE 400 MG ORAL TABLET 400 MILLIGRAM(S): 241.3 TABLET ORAL at 08:36

## 2020-08-01 RX ADMIN — DONEPEZIL HYDROCHLORIDE 5 MILLIGRAM(S): 10 TABLET, FILM COATED ORAL at 21:33

## 2020-08-01 NOTE — PROGRESS NOTE ADULT - ASSESSMENT
Impression:  1. Encephalopathy improved  2. severe Dementia    Plan:  1. Continue OOB/PT  2. MRI brain pending  3. consider placement

## 2020-08-02 PROCEDURE — 99232 SBSQ HOSP IP/OBS MODERATE 35: CPT

## 2020-08-02 RX ADMIN — Medication 5 MILLIGRAM(S): at 21:45

## 2020-08-02 RX ADMIN — HEPARIN SODIUM 5000 UNIT(S): 5000 INJECTION INTRAVENOUS; SUBCUTANEOUS at 17:03

## 2020-08-02 RX ADMIN — HEPARIN SODIUM 5000 UNIT(S): 5000 INJECTION INTRAVENOUS; SUBCUTANEOUS at 05:41

## 2020-08-02 RX ADMIN — Medication 1 TABLET(S): at 11:47

## 2020-08-02 RX ADMIN — DONEPEZIL HYDROCHLORIDE 5 MILLIGRAM(S): 10 TABLET, FILM COATED ORAL at 21:45

## 2020-08-02 RX ADMIN — Medication 100 MILLIGRAM(S): at 11:47

## 2020-08-02 NOTE — PROGRESS NOTE ADULT - ASSESSMENT
78 y/o female with PMH of dementia not on any medications, lives alone with periodically visits from niece and nephew, On 7/29/20 Niece Rosita came to visit to check on her and when entered home pt was noted to be covered with copious amount of stool, very confused, which is not her baseline. Pt was brought to the ER via EMS, and did not have any BM/ Loose stools in the ER, and had been quite alert and co-operative in the ER, likely at baseline.  Pt had no complaints of chest pain, abdominal pain, N/V, fever or urine symptoms. Pt also denies any fall/trauma, cough, or fever.  Per niece, pt was not recently on antibiotic,  sometimes has food brought by family.  Pt was admitted for further evaluation of AMS.    AMS  -improved  -Likely 2/2 to dehydration.  Per records EMS noted that pt's house was not suitable for current weather conditions , there was no AC or fan in the house, weather has been very hot. This likely contributed to dehydration and confusion given pt's mental status improved with IVFs.   -CT brain showed extensive chronic ischemic changes in both hemispheres as well as within the posterior fossa. recommending a follow up MRI of brain  -MRI: no acute infarct or intracranial hemorrhage; extensive chronic microvascular disease  -Neuro consult appreciated. Per recs start Donepezil 5mg daily.  -Social work consulted for placement   -cont enhanced supervision for safety    Hypokalemia  -improved    Dementia  -Neuro consult appreciated.   -Continue Donepezil 5 mg daily    Left Ankle Pain  -Likely chronic, ambulating with no issue  -X-Ray of ankle negative for fracture  -Tylenol PRN pain    Anemia, unspecified type, mild anemia   -.2, Total Iron-152, TIBC-197, iron Saturation 77, Ferritin, 549  -TSH wnl, T3-52, Vit , folate 13.4  -Continue to trend H/H  -Continue Thiamine, and Multivitamins daily    Right Renal Cyst  -Right renal cyst, measuring 6.2 cm.  -Will continue to monitor, F/u Outpt recommended    DISPO: SW following for placement following hospital due to progressing dementia 80 y/o female with PMH of dementia not on any medications, lives alone with periodically visits from niece and nephew, On 7/29/20 Niece Rosita came to visit to check on her and when entered home pt was noted to be covered with copious amount of stool, very confused, which is not her baseline. Pt was brought to the ER via EMS, and did not have any BM/ Loose stools in the ER, and had been quite alert and co-operative in the ER, likely at baseline.  Pt had no complaints of chest pain, abdominal pain, N/V, fever or urine symptoms. Pt also denies any fall/trauma, cough, or fever.  Per niece, pt was not recently on antibiotic,  sometimes has food brought by family.  Pt was admitted for further evaluation of AMS.    AMS  -improved  -Likely 2/2 to dehydration.  Per records EMS noted that pt's house was not suitable for current weather conditions , there was no AC or fan in the house, weather has been very hot. This likely contributed to dehydration and confusion given pt's mental status improved with IVFs.   -CT brain showed extensive chronic ischemic changes in both hemispheres as well as within the posterior fossa. recommending a follow up MRI of brain  -MRI: no acute infarct or intracranial hemorrhage; extensive chronic microvascular disease  -Neuro consult appreciated. Per recs start Donepezil 5mg daily.  -Social work consulted for placement   -cont enhanced supervision for safety    Hypokalemia  -improved    Dementia  -Neuro consult appreciated.   -Continue Donepezil 5 mg daily    Left Ankle Pain  -Likely chronic, ambulating with no issue  -X-Ray of ankle negative for fracture  -Tylenol PRN pain    Anemia, unspecified type, mild anemia   -.2, Total Iron-152, TIBC-197, iron Saturation 77, Ferritin, 549  -TSH wnl, T3-52, Vit , folate 13.4  -Continue to trend H/H  -Continue Thiamine, and Multivitamins daily    Right Renal Cyst  -Right renal cyst, measuring 6.2 cm.  -Will continue to monitor, F/u Outpt recommended    DISPO: SW following for placement following hospital due to progressing dementia  pt's HCP/niece, Rosita updated

## 2020-08-03 ENCOUNTER — TRANSCRIPTION ENCOUNTER (OUTPATIENT)
Age: 80
End: 2020-08-03

## 2020-08-03 DIAGNOSIS — F03.90 UNSPECIFIED DEMENTIA WITHOUT BEHAVIORAL DISTURBANCE: ICD-10-CM

## 2020-08-03 DIAGNOSIS — G93.40 ENCEPHALOPATHY, UNSPECIFIED: ICD-10-CM

## 2020-08-03 PROCEDURE — 99232 SBSQ HOSP IP/OBS MODERATE 35: CPT

## 2020-08-03 RX ORDER — ASPIRIN/CALCIUM CARB/MAGNESIUM 324 MG
81 TABLET ORAL DAILY
Refills: 0 | Status: DISCONTINUED | OUTPATIENT
Start: 2020-08-03 | End: 2020-08-06

## 2020-08-03 RX ORDER — LANOLIN ALCOHOL/MO/W.PET/CERES
1 CREAM (GRAM) TOPICAL
Qty: 0 | Refills: 0 | DISCHARGE
Start: 2020-08-03

## 2020-08-03 RX ORDER — DONEPEZIL HYDROCHLORIDE 10 MG/1
1 TABLET, FILM COATED ORAL
Qty: 0 | Refills: 0 | DISCHARGE
Start: 2020-08-03

## 2020-08-03 RX ORDER — ALBUTEROL 90 UG/1
2 AEROSOL, METERED ORAL
Qty: 0 | Refills: 0 | DISCHARGE
Start: 2020-08-03

## 2020-08-03 RX ORDER — ASPIRIN/CALCIUM CARB/MAGNESIUM 324 MG
1 TABLET ORAL
Qty: 0 | Refills: 0 | DISCHARGE
Start: 2020-08-03

## 2020-08-03 RX ORDER — HEPARIN SODIUM 5000 [USP'U]/ML
5000 INJECTION INTRAVENOUS; SUBCUTANEOUS
Qty: 0 | Refills: 0 | DISCHARGE
Start: 2020-08-03

## 2020-08-03 RX ORDER — PREGABALIN 225 MG/1
1 CAPSULE ORAL
Qty: 0 | Refills: 0 | DISCHARGE
Start: 2020-08-03

## 2020-08-03 RX ORDER — PREGABALIN 225 MG/1
1000 CAPSULE ORAL DAILY
Refills: 0 | Status: DISCONTINUED | OUTPATIENT
Start: 2020-08-03 | End: 2020-08-06

## 2020-08-03 RX ADMIN — Medication 81 MILLIGRAM(S): at 17:42

## 2020-08-03 RX ADMIN — Medication 5 MILLIGRAM(S): at 21:24

## 2020-08-03 RX ADMIN — DONEPEZIL HYDROCHLORIDE 5 MILLIGRAM(S): 10 TABLET, FILM COATED ORAL at 21:23

## 2020-08-03 RX ADMIN — Medication 100 MILLIGRAM(S): at 12:45

## 2020-08-03 RX ADMIN — PREGABALIN 1000 MICROGRAM(S): 225 CAPSULE ORAL at 12:45

## 2020-08-03 RX ADMIN — HEPARIN SODIUM 5000 UNIT(S): 5000 INJECTION INTRAVENOUS; SUBCUTANEOUS at 17:40

## 2020-08-03 RX ADMIN — Medication 1 TABLET(S): at 12:45

## 2020-08-03 RX ADMIN — HEPARIN SODIUM 5000 UNIT(S): 5000 INJECTION INTRAVENOUS; SUBCUTANEOUS at 05:24

## 2020-08-03 NOTE — CHART NOTE - NSCHARTNOTEFT_GEN_A_CORE
Upon Nutritional Assessment by the Registered Dietitian your patient was determined to meet criteria / has evidence of the following diagnosis/diagnoses:          [ ]  Mild Protein Calorie Malnutrition        [ x]  Moderate Protein Calorie Malnutrition CHRONIC        [ ] Severe Protein Calorie Malnutrition        [ ] Unspecified Protein Calorie Malnutrition        [ ] Underweight / BMI <19        [ ] Morbid Obesity / BMI > 40      Findings as based on:  •  Comprehensive nutrition assessment and consultation  •  Calorie counts (nutrient intake analysis)  •  Food acceptance and intake status from observations by staff  •  Follow up  •  Patient education  •  Intervention secondary to interdisciplinary rounds  •   concerns      Treatment:    The following diet has been recommended: REc Ensure TID, provide Magic Cup TID      PROVIDER Section:     By signing this assessment you are acknowledging and agree with the diagnosis/diagnoses assigned by the Registered Dietitian    Comments:

## 2020-08-03 NOTE — PROGRESS NOTE ADULT - ASSESSMENT
Impression:  1. Encephalopathy improved  2. severe Dementia    Plan:  1. Continue OOB/PT  2. Neuro stable.  3. Consider placement  4. DVT prophylaxis recommended.  5. Reconsult PRN.

## 2020-08-03 NOTE — DISCHARGE NOTE PROVIDER - HOSPITAL COURSE
78 y/o female with PMH of dementia not on any medications, lives alone with periodically visits from niece and nephew, On 7/29/20 Niece Rosita came to visit to check on her and when entered home pt was noted to be covered with copious amount of stool, very confused, which is not her baseline. Pt was brought to the ER via EMS, and did not have any BM/ Loose stools in the ER, and had been quite alert and co-operative in the ER, likely at baseline.  Pt had no complaints of chest pain, abdominal pain, N/V, fever or urine symptoms. Pt also denies any fall/trauma, cough, or fever.  Per niece, pt was not recently on antibiotic,  sometimes has food brought by family.  Pt was admitted for further evaluation of AMS, seen by neurologist CT of head ;     1)  extensive chronic ischemic changes in both hemispheres as well as within the posterior fossa.    2)  follow-up MR imaging of the brain is recommended to rule out the possibility of a brain stem infarct, as there are artifacts which obscure detail in the posterior fossa.    MR is neg for stroke     given iv hydration  for suspected dehydration         pt is improved at baseline , started on donazepil per neurologist 78 y/o female with PMH of dementia not on any medications, lives alone with periodically visits from niece and nephew, On 7/29/20 Niece Rosita came to visit to check on her and when entered home pt was noted to be covered with copious amount of stool, very confused, which is not her baseline. Pt was brought to the ER via EMS, and did not have any BM/ Loose stools in the ER, and had been quite alert and co-operative in the ER, likely at baseline.  Pt had no complaints of chest pain, abdominal pain, N/V, fever or urine symptoms. Pt also denies any fall/trauma, cough, or fever.  Per niece, pt was not recently on antibiotic,  sometimes has food brought by family.  Pt was admitted for further evaluation of AMS, seen by neurologist CT of head ;     1)  extensive chronic ischemic changes in both hemispheres as well as within the posterior fossa.    2)  follow-up MR imaging of the brain is recommended to rule out the possibility of a brain stem infarct, as there are artifacts which obscure detail in the posterior fossa.    MR is neg for stroke     given iv hydration  for suspected dehydration         pt is improved at baseline , started on donazepil per neurologist , pt is with foul smelling urine UA urine cx ordered , UA positive abnormal  started on rocephin , leukocytosis  trending down , afebrile , given 3 days iv rocephin and discharged to rehab with po abx to complete course , cx send contamination noted repeat cx reordered on abx         stable     total time spend

## 2020-08-03 NOTE — DISCHARGE NOTE PROVIDER - NSDCCPCAREPLAN_GEN_ALL_CORE_FT
PRINCIPAL DISCHARGE DIAGNOSIS  Diagnosis: Encephalopathy  Assessment and Plan of Treatment: acute with dementia   stable PRINCIPAL DISCHARGE DIAGNOSIS  Diagnosis: Encephalopathy  Assessment and Plan of Treatment: acute with dementia   stable      SECONDARY DISCHARGE DIAGNOSES  Diagnosis: UTI (urinary tract infection)  Assessment and Plan of Treatment: complete course of cipro    Diagnosis: Mild anemia  Assessment and Plan of Treatment: continue vit b 12 folic acid

## 2020-08-03 NOTE — DISCHARGE NOTE PROVIDER - NSDCMRMEDTOKEN_GEN_ALL_CORE_FT
albuterol 90 mcg/inh inhalation aerosol: 2 puff(s) inhaled every 6 hours, As needed, Shortness of Breath and/or Wheezing  cyanocobalamin 1000 mcg oral tablet: 1 tab(s) orally once a day  donepezil 5 mg oral tablet: 1 tab(s) orally once a day (at bedtime)  heparin: 5000 unit(s) subcutaneous 2 times a day  melatonin 5 mg oral tablet: 1 tab(s) orally once a day (at bedtime)  Multiple Vitamins oral tablet: 1 tab(s) orally once a day albuterol 90 mcg/inh inhalation aerosol: 2 puff(s) inhaled every 6 hours, As needed, Shortness of Breath and/or Wheezing  aspirin 81 mg oral delayed release tablet: 1 tab(s) orally once a day  Cipro 250 mg oral tablet: 1 tab(s) orally every 12 hours start 8/7 am total 2 days   cyanocobalamin 1000 mcg oral tablet: 1 tab(s) orally once a day  donepezil 5 mg oral tablet: 1 tab(s) orally once a day (at bedtime)  heparin: 5000 unit(s) subcutaneous 2 times a day  melatonin 5 mg oral tablet: 1 tab(s) orally once a day (at bedtime)  Multiple Vitamins oral tablet: 1 tab(s) orally once a day albuterol 90 mcg/inh inhalation aerosol: 2 puff(s) inhaled every 6 hours, As needed, Shortness of Breath and/or Wheezing  aspirin 81 mg oral delayed release tablet: 1 tab(s) orally once a day  cyanocobalamin 1000 mcg oral tablet: 1 tab(s) orally once a day  donepezil 5 mg oral tablet: 1 tab(s) orally once a day (at bedtime)  Florastor 250 mg oral capsule: 1 cap(s) orally 2 times a dayx3 days  folic acid 1 mg oral tablet: 1 tab(s) orally once a day  heparin: 5000 unit(s) subcutaneous 2 times a day  Macrobid 100 mg oral capsule: 1 cap(s) orally 2 times a day start in am total 4 days   melatonin 5 mg oral tablet: 1 tab(s) orally once a day (at bedtime)  Multiple Vitamins oral tablet: 1 tab(s) orally once a day

## 2020-08-03 NOTE — DIETITIAN INITIAL EVALUATION ADULT. - ADD RECOMMEND
Normal rate, regular rhythm, normal S1, S2 heart sounds heard. Monitor po intake, labs, weights. Provide magic cup TID

## 2020-08-03 NOTE — DIETITIAN INITIAL EVALUATION ADULT. - OTHER INFO
78 y/o female with h/o dementia, on no meds, lives alone, niece and nephew visit to check on her and today when niece Rosita went to her house pt. was noted to be covered with copious amount of stool , more confused than her baseline. pt. was brought to the ER via EMS. pt. did not have any BM/ Loose stools in the ER, has been quite alert and co-operative in the ER, likely at baseline,  no cp, no abd. pain, no n/v. no fever. no urine symptoms.  Denies any fall, no cough, no fever. As per niece pt. was not on any antibiotics recently, sometime gets food sent in by family from out side. Pt reports poor po intake with ~15# weight loss recently.

## 2020-08-03 NOTE — PROGRESS NOTE ADULT - ASSESSMENT
80 y/o female with PMH of dementia not on any medications, lives alone with periodically visits from niece and nephew, On 7/29/20 Niece Rosita came to visit to check on her and when entered home pt was noted to be covered with copious amount of stool, very confused, which is not her baseline. Pt was brought to the ER via EMS, and did not have any BM/ Loose stools in the ER, and had been quite alert and co-operative in the ER, likely at baseline.  Pt had no complaints of chest pain, abdominal pain, N/V, fever or urine symptoms. Pt also denies any fall/trauma, cough, or fever.  Per niece, pt was not recently on antibiotic,  sometimes has food brought by family.  Pt was admitted for further evaluation of AMS      1-acute encephalopathy likely due to dementia   stable   will discuss with daughter and plan for discharge     2- Hypokalemia   replaced   K is stable     3- Anemia -cronic disease hb is stable

## 2020-08-04 LAB
ANION GAP SERPL CALC-SCNC: 14 MMOL/L — SIGNIFICANT CHANGE UP (ref 5–17)
APPEARANCE UR: ABNORMAL
BACTERIA # UR AUTO: ABNORMAL
BILIRUB UR-MCNC: NEGATIVE — SIGNIFICANT CHANGE UP
BUN SERPL-MCNC: 21 MG/DL — HIGH (ref 8–20)
CALCIUM SERPL-MCNC: 9.7 MG/DL — SIGNIFICANT CHANGE UP (ref 8.6–10.2)
CHLORIDE SERPL-SCNC: 101 MMOL/L — SIGNIFICANT CHANGE UP (ref 98–107)
CO2 SERPL-SCNC: 26 MMOL/L — SIGNIFICANT CHANGE UP (ref 22–29)
COLOR SPEC: YELLOW — SIGNIFICANT CHANGE UP
CREAT SERPL-MCNC: 0.93 MG/DL — SIGNIFICANT CHANGE UP (ref 0.5–1.3)
DIFF PNL FLD: ABNORMAL
EPI CELLS # UR: SIGNIFICANT CHANGE UP
GLUCOSE SERPL-MCNC: 148 MG/DL — HIGH (ref 70–99)
GLUCOSE UR QL: NEGATIVE — SIGNIFICANT CHANGE UP
HCT VFR BLD CALC: 30.2 % — LOW (ref 34.5–45)
HGB BLD-MCNC: 10 G/DL — LOW (ref 11.5–15.5)
KETONES UR-MCNC: NEGATIVE — SIGNIFICANT CHANGE UP
LEUKOCYTE ESTERASE UR-ACNC: ABNORMAL
MCHC RBC-ENTMCNC: 33.1 GM/DL — SIGNIFICANT CHANGE UP (ref 32–36)
MCHC RBC-ENTMCNC: 34.5 PG — HIGH (ref 27–34)
MCV RBC AUTO: 104.1 FL — HIGH (ref 80–100)
NITRITE UR-MCNC: POSITIVE
PH UR: 8 — SIGNIFICANT CHANGE UP (ref 5–8)
PHOSPHATE SERPL-MCNC: 3.1 MG/DL — SIGNIFICANT CHANGE UP (ref 2.4–4.7)
PLATELET # BLD AUTO: 507 K/UL — HIGH (ref 150–400)
POTASSIUM SERPL-MCNC: 4 MMOL/L — SIGNIFICANT CHANGE UP (ref 3.5–5.3)
POTASSIUM SERPL-SCNC: 4 MMOL/L — SIGNIFICANT CHANGE UP (ref 3.5–5.3)
PROT UR-MCNC: 100
RBC # BLD: 2.9 M/UL — LOW (ref 3.8–5.2)
RBC # FLD: 25.8 % — HIGH (ref 10.3–14.5)
RBC CASTS # UR COMP ASSIST: ABNORMAL /HPF (ref 0–4)
SODIUM SERPL-SCNC: 141 MMOL/L — SIGNIFICANT CHANGE UP (ref 135–145)
SP GR SPEC: 1.01 — SIGNIFICANT CHANGE UP (ref 1.01–1.02)
UROBILINOGEN FLD QL: NEGATIVE — SIGNIFICANT CHANGE UP
WBC # BLD: 12.46 K/UL — HIGH (ref 3.8–10.5)
WBC # FLD AUTO: 12.46 K/UL — HIGH (ref 3.8–10.5)
WBC UR QL: ABNORMAL

## 2020-08-04 PROCEDURE — 99233 SBSQ HOSP IP/OBS HIGH 50: CPT

## 2020-08-04 RX ORDER — CEFTRIAXONE 500 MG/1
1000 INJECTION, POWDER, FOR SOLUTION INTRAMUSCULAR; INTRAVENOUS EVERY 24 HOURS
Refills: 0 | Status: DISCONTINUED | OUTPATIENT
Start: 2020-08-04 | End: 2020-08-05

## 2020-08-04 RX ORDER — SODIUM CHLORIDE 9 MG/ML
1000 INJECTION INTRAMUSCULAR; INTRAVENOUS; SUBCUTANEOUS
Refills: 0 | Status: COMPLETED | OUTPATIENT
Start: 2020-08-04 | End: 2020-08-04

## 2020-08-04 RX ORDER — SACCHAROMYCES BOULARDII 250 MG
250 POWDER IN PACKET (EA) ORAL
Refills: 0 | Status: DISCONTINUED | OUTPATIENT
Start: 2020-08-04 | End: 2020-08-06

## 2020-08-04 RX ORDER — AMLODIPINE BESYLATE 2.5 MG/1
5 TABLET ORAL DAILY
Refills: 0 | Status: DISCONTINUED | OUTPATIENT
Start: 2020-08-04 | End: 2020-08-05

## 2020-08-04 RX ADMIN — CEFTRIAXONE 100 MILLIGRAM(S): 500 INJECTION, POWDER, FOR SOLUTION INTRAMUSCULAR; INTRAVENOUS at 13:44

## 2020-08-04 RX ADMIN — HEPARIN SODIUM 5000 UNIT(S): 5000 INJECTION INTRAVENOUS; SUBCUTANEOUS at 05:52

## 2020-08-04 RX ADMIN — Medication 250 MILLIGRAM(S): at 17:42

## 2020-08-04 RX ADMIN — Medication 5 MILLIGRAM(S): at 21:16

## 2020-08-04 RX ADMIN — Medication 81 MILLIGRAM(S): at 12:24

## 2020-08-04 RX ADMIN — HEPARIN SODIUM 5000 UNIT(S): 5000 INJECTION INTRAVENOUS; SUBCUTANEOUS at 17:42

## 2020-08-04 RX ADMIN — SODIUM CHLORIDE 85 MILLILITER(S): 9 INJECTION INTRAMUSCULAR; INTRAVENOUS; SUBCUTANEOUS at 14:23

## 2020-08-04 RX ADMIN — Medication 1 TABLET(S): at 12:24

## 2020-08-04 RX ADMIN — DONEPEZIL HYDROCHLORIDE 5 MILLIGRAM(S): 10 TABLET, FILM COATED ORAL at 21:16

## 2020-08-04 RX ADMIN — Medication 100 MILLIGRAM(S): at 12:24

## 2020-08-04 RX ADMIN — PREGABALIN 1000 MICROGRAM(S): 225 CAPSULE ORAL at 12:24

## 2020-08-04 NOTE — PROGRESS NOTE ADULT - ASSESSMENT
78 y/o female with PMH of dementia not on any medications, lives alone with periodically visits from niece and nephew, On 7/29/20 Niece Rosita came to visit to check on her and when entered home pt was noted to be covered with copious amount of stool, very confused, which is not her baseline. Pt was brought to the ER via EMS, and did not have any BM/ Loose stools in the ER, and had been quite alert and co-operative in the ER, likely at baseline.  Pt had no complaints of chest pain, abdominal pain, N/V, fever or urine symptoms. Pt also denies any fall/trauma, cough, or fever.  Per niece, pt was not recently on antibiotic,  sometimes has food brought by family.  Pt was admitted for further evaluation of AMS, MR of brain neg for stroke , likely due to advanced dementia , pending NH placement , foul smelling urine  , UA ordered + nitrite , suspected to       1-acute encephalopathy likely due to dementia   stable     2- abnormal urine , nitrite +   suspected UTI   will send clean Urine for culture start empirical iv rocephin   check CBC     3- Anemia -cronic disease hb is stable     4- Hypokalemia   replaced , resolved     discharge likely  soon to NH with po abx pending cx result

## 2020-08-04 NOTE — CDI QUERY NOTE - NSCDIOTHERTXTBX_GEN_ALL_CORE_HH
SUPPORTING DOCUMENTATION / EVIDENCE:  admitted with AMS, found to be dehydrated    Nutrition Consult on 8/3 = moderate protein calorie malnutrition, signed by MD      CHART NOTE - NUTRITION 8/3:  · Note Type  Nutrition Services      Upon Nutritional Assessment by the Registered Dietitian your patient was determined to meet criteria / has evidence of the following diagnosis/diagnoses:          [ ]  Mild Protein Calorie Malnutrition        [ x]  Moderate Protein Calorie Malnutrition CHRONIC        [ ] Severe Protein Calorie Malnutrition        [ ] Unspecified Protein Calorie Malnutrition        [ ] Underweight / BMI <19        [ ] Morbid Obesity / BMI > 40    Findings as based on:  •  Comprehensive nutrition assessment and consultation  •  Calorie counts (nutrient intake analysis)  •  Food acceptance and intake status from observations by staff  •  Follow up  •  Patient education  •  Intervention secondary to interdisciplinary rounds  •   concerns      Treatment:    The following diet has been recommended: REc Ensure TID, provide Magic Cup TID          [place] Guidelines state we cannot code malnutrition from the nutrition note, even if the note is signed by the MD.    The MD must also document the diagnosis in progress notes and/or discharge summary.      Please specify the clinical significance of these findings based on your clinical judgement such as:    -           Moderate protein calorie malnutrition  -           Other (please specify)  -           Clinically unable to be determined      Please document your response in addendum to discharge summary as appropriate.    Thank you.

## 2020-08-04 NOTE — PROCEDURE NOTE - NSPROCDETAILS_GEN_ALL_CORE
ultrasound utilization/blood seen on insertion/dressing applied/flushes easily/secured in place/sterile technique, catheter placed

## 2020-08-04 NOTE — CDI QUERY NOTE - NSCDIOTHERTXTBX2_GEN_ALL_CORE_FT
SUPPORTING DOCUMENTATION / EVIDENCE:  Admitted with AMS  conflicting documentation for etiology of AMS      Attending 7/30 - 8/2: AMS 2/2 dehydration      Attending 8/3-8/4: Acute encephalopathy likely d/t dementia   8/4: abnormal urine, nitrite+ suspected UTI      Neuro 8/1 & 8/3:  encephalopathy improved, severe dementia        Please clarify, in progress notes and dc summary:  1. Did UTI ruled in or out?  2. Likely etiology of AMS   - AMS likely multifactorial: dehydration, dementia and encephalopathy (specify type, ie metabolic, toxic, other)   - AMS likely 2/2 __________   - other   - not clinically significant    Thank you

## 2020-08-05 LAB
ANION GAP SERPL CALC-SCNC: 13 MMOL/L — SIGNIFICANT CHANGE UP (ref 5–17)
BUN SERPL-MCNC: 23 MG/DL — HIGH (ref 8–20)
CALCIUM SERPL-MCNC: 9.5 MG/DL — SIGNIFICANT CHANGE UP (ref 8.6–10.2)
CHLORIDE SERPL-SCNC: 102 MMOL/L — SIGNIFICANT CHANGE UP (ref 98–107)
CO2 SERPL-SCNC: 25 MMOL/L — SIGNIFICANT CHANGE UP (ref 22–29)
CREAT SERPL-MCNC: 0.92 MG/DL — SIGNIFICANT CHANGE UP (ref 0.5–1.3)
GLUCOSE SERPL-MCNC: 93 MG/DL — SIGNIFICANT CHANGE UP (ref 70–99)
HCT VFR BLD CALC: 29.5 % — LOW (ref 34.5–45)
HGB BLD-MCNC: 9.8 G/DL — LOW (ref 11.5–15.5)
MCHC RBC-ENTMCNC: 33.2 GM/DL — SIGNIFICANT CHANGE UP (ref 32–36)
MCHC RBC-ENTMCNC: 34.4 PG — HIGH (ref 27–34)
MCV RBC AUTO: 103.5 FL — HIGH (ref 80–100)
PLATELET # BLD AUTO: 477 K/UL — HIGH (ref 150–400)
POTASSIUM SERPL-MCNC: 4.2 MMOL/L — SIGNIFICANT CHANGE UP (ref 3.5–5.3)
POTASSIUM SERPL-SCNC: 4.2 MMOL/L — SIGNIFICANT CHANGE UP (ref 3.5–5.3)
RBC # BLD: 2.85 M/UL — LOW (ref 3.8–5.2)
RBC # FLD: 26.2 % — HIGH (ref 10.3–14.5)
SODIUM SERPL-SCNC: 140 MMOL/L — SIGNIFICANT CHANGE UP (ref 135–145)
WBC # BLD: 9.73 K/UL — SIGNIFICANT CHANGE UP (ref 3.8–10.5)
WBC # FLD AUTO: 9.73 K/UL — SIGNIFICANT CHANGE UP (ref 3.8–10.5)

## 2020-08-05 PROCEDURE — 99232 SBSQ HOSP IP/OBS MODERATE 35: CPT

## 2020-08-05 RX ORDER — SODIUM CHLORIDE 9 MG/ML
1000 INJECTION INTRAMUSCULAR; INTRAVENOUS; SUBCUTANEOUS
Refills: 0 | Status: DISCONTINUED | OUTPATIENT
Start: 2020-08-05 | End: 2020-08-05

## 2020-08-05 RX ORDER — CEFTRIAXONE 500 MG/1
1000 INJECTION, POWDER, FOR SOLUTION INTRAMUSCULAR; INTRAVENOUS EVERY 24 HOURS
Refills: 0 | Status: DISCONTINUED | OUTPATIENT
Start: 2020-08-05 | End: 2020-08-06

## 2020-08-05 RX ORDER — SODIUM CHLORIDE 9 MG/ML
1000 INJECTION INTRAMUSCULAR; INTRAVENOUS; SUBCUTANEOUS ONCE
Refills: 0 | Status: DISCONTINUED | OUTPATIENT
Start: 2020-08-05 | End: 2020-08-05

## 2020-08-05 RX ORDER — SODIUM CHLORIDE 9 MG/ML
1000 INJECTION INTRAMUSCULAR; INTRAVENOUS; SUBCUTANEOUS ONCE
Refills: 0 | Status: COMPLETED | OUTPATIENT
Start: 2020-08-05 | End: 2020-08-05

## 2020-08-05 RX ORDER — NITROFURANTOIN MACROCRYSTAL 50 MG
100 CAPSULE ORAL
Refills: 0 | Status: DISCONTINUED | OUTPATIENT
Start: 2020-08-05 | End: 2020-08-05

## 2020-08-05 RX ADMIN — CEFTRIAXONE 100 MILLIGRAM(S): 500 INJECTION, POWDER, FOR SOLUTION INTRAMUSCULAR; INTRAVENOUS at 13:49

## 2020-08-05 RX ADMIN — DONEPEZIL HYDROCHLORIDE 5 MILLIGRAM(S): 10 TABLET, FILM COATED ORAL at 21:16

## 2020-08-05 RX ADMIN — PREGABALIN 1000 MICROGRAM(S): 225 CAPSULE ORAL at 12:42

## 2020-08-05 RX ADMIN — Medication 100 MILLIGRAM(S): at 12:42

## 2020-08-05 RX ADMIN — HEPARIN SODIUM 5000 UNIT(S): 5000 INJECTION INTRAVENOUS; SUBCUTANEOUS at 17:03

## 2020-08-05 RX ADMIN — Medication 250 MILLIGRAM(S): at 17:03

## 2020-08-05 RX ADMIN — SODIUM CHLORIDE 1000 MILLILITER(S): 9 INJECTION INTRAMUSCULAR; INTRAVENOUS; SUBCUTANEOUS at 17:03

## 2020-08-05 RX ADMIN — Medication 81 MILLIGRAM(S): at 12:42

## 2020-08-05 RX ADMIN — AMLODIPINE BESYLATE 5 MILLIGRAM(S): 2.5 TABLET ORAL at 06:46

## 2020-08-05 RX ADMIN — Medication 1 TABLET(S): at 12:42

## 2020-08-05 RX ADMIN — Medication 250 MILLIGRAM(S): at 06:46

## 2020-08-05 RX ADMIN — Medication 5 MILLIGRAM(S): at 21:16

## 2020-08-05 RX ADMIN — HEPARIN SODIUM 5000 UNIT(S): 5000 INJECTION INTRAVENOUS; SUBCUTANEOUS at 06:46

## 2020-08-05 NOTE — CHART NOTE - NSCHARTNOTEFT_GEN_A_CORE
Called by RN to evaluate IV that infiltrated while running normal saline.  Patient seen and examined, in NAD.   Large infiltrate on medial aspect of R upper arm.   As per RN patient had Iv infiltrate on L arm earlier today- Medium sized infiltrate present near L elbow.   Sensation, motor and strength intact.   Apply warm compress and elevate b/l upper extremities.  Will hold off on restarting IV at this time, encouraged PO water intake.   Continue to monitor patient, notify PA of any changes in patient status.

## 2020-08-05 NOTE — PROGRESS NOTE ADULT - ASSESSMENT
78 y/o female with PMH of dementia not on any medications, lives alone with periodically visits from niece and nephew, On 7/29/20 Niece Rosita came to visit to check on her and when entered home pt was noted to be covered with copious amount of stool, very confused, which is not her baseline. Pt was brought to the ER via EMS, and did not have any BM/ Loose stools in the ER, and had been quite alert and co-operative in the ER, likely at baseline.  Pt had no complaints of chest pain, abdominal pain, N/V, fever or urine symptoms. Pt also denies any fall/trauma, cough, or fever.  Per niece, pt was not recently on antibiotic,  sometimes has food brought by family.  Pt was admitted for further evaluation of AMS, MR of brain neg for stroke , likely due to advanced dementia , pending NH placement , foul smelling urine  , UA ordered + nitrite , started on empirical rocephin for UTI     1- UTI most likely   cont rocephin , cbc is trending down   follow up urine cx and change to po abx prior discharge     2- Dementia with acute encephopathy on admission   at baseline   infection stroke ruled out on admission   cont current meds for dementia no behavioral issues    3- Hypotension   will order fluid bolus stop amlodipine   monitor q4h closely     4- Anemia -cronic disease hb is stable     5- Moderate protein uriel malnutrition   agree with nutrionist evlaution   rec ensure and MVI     6- Hypokalemia resolved   replaced    discharge likely  soon to NH pending cx result 78 y/o female with PMH of dementia not on any medications, lives alone with periodically visits from niece and nephew, On 7/29/20 Niece Rosita came to visit to check on her and when entered home pt was noted to be covered with copious amount of stool, very confused, which is not her baseline. Pt was brought to the ER via EMS, and did not have any BM/ Loose stools in the ER, and had been quite alert and co-operative in the ER, likely at baseline.  Pt had no complaints of chest pain, abdominal pain, N/V, fever or urine symptoms. Pt also denies any fall/trauma, cough, or fever.  Per niece, pt was not recently on antibiotic,  sometimes has food brought by family.  Pt was admitted for further evaluation of AMS, MR of brain neg for stroke , likely due to advanced dementia , pending NH placement , foul smelling urine  , UA ordered + nitrite , started on empirical rocephin for UTI     1- UTI most likely   cont rocephin , cbc is trending down   follow up urine cx and change to po abx prior discharge     2- Dementia with acute encephopathy on admission   at baseline   infection stroke ruled out on admission   cont current meds for dementia no behavioral issues      3- Hypotension   repeat BP is now improved 130's  stop amlodipine monitor closely     4- Anemia -cronic disease hb is stable     5- Moderate protein uriel malnutrition   agree with nutrionist evlaution   rec ensure and MVI     6- Hypokalemia resolved   replaced    discharge likely  soon to NH pending cx result

## 2020-08-06 ENCOUNTER — TRANSCRIPTION ENCOUNTER (OUTPATIENT)
Age: 80
End: 2020-08-06

## 2020-08-06 VITALS
RESPIRATION RATE: 18 BRPM | DIASTOLIC BLOOD PRESSURE: 70 MMHG | SYSTOLIC BLOOD PRESSURE: 144 MMHG | HEART RATE: 77 BPM | TEMPERATURE: 98 F | OXYGEN SATURATION: 98 %

## 2020-08-06 DIAGNOSIS — D64.9 ANEMIA, UNSPECIFIED: ICD-10-CM

## 2020-08-06 LAB
CULTURE RESULTS: SIGNIFICANT CHANGE UP
SPECIMEN SOURCE: SIGNIFICANT CHANGE UP

## 2020-08-06 PROCEDURE — 80053 COMPREHEN METABOLIC PANEL: CPT

## 2020-08-06 PROCEDURE — 84466 ASSAY OF TRANSFERRIN: CPT

## 2020-08-06 PROCEDURE — 84100 ASSAY OF PHOSPHORUS: CPT

## 2020-08-06 PROCEDURE — 74176 CT ABD & PELVIS W/O CONTRAST: CPT

## 2020-08-06 PROCEDURE — 84480 ASSAY TRIIODOTHYRONINE (T3): CPT

## 2020-08-06 PROCEDURE — 82746 ASSAY OF FOLIC ACID SERUM: CPT

## 2020-08-06 PROCEDURE — 71045 X-RAY EXAM CHEST 1 VIEW: CPT

## 2020-08-06 PROCEDURE — 84484 ASSAY OF TROPONIN QUANT: CPT

## 2020-08-06 PROCEDURE — 97116 GAIT TRAINING THERAPY: CPT

## 2020-08-06 PROCEDURE — 97110 THERAPEUTIC EXERCISES: CPT

## 2020-08-06 PROCEDURE — 85610 PROTHROMBIN TIME: CPT

## 2020-08-06 PROCEDURE — 80048 BASIC METABOLIC PNL TOTAL CA: CPT

## 2020-08-06 PROCEDURE — 86780 TREPONEMA PALLIDUM: CPT

## 2020-08-06 PROCEDURE — 82607 VITAMIN B-12: CPT

## 2020-08-06 PROCEDURE — 99232 SBSQ HOSP IP/OBS MODERATE 35: CPT

## 2020-08-06 PROCEDURE — 85027 COMPLETE CBC AUTOMATED: CPT

## 2020-08-06 PROCEDURE — 84443 ASSAY THYROID STIM HORMONE: CPT

## 2020-08-06 PROCEDURE — 85045 AUTOMATED RETICULOCYTE COUNT: CPT

## 2020-08-06 PROCEDURE — 97530 THERAPEUTIC ACTIVITIES: CPT

## 2020-08-06 PROCEDURE — 83735 ASSAY OF MAGNESIUM: CPT

## 2020-08-06 PROCEDURE — 70450 CT HEAD/BRAIN W/O DYE: CPT

## 2020-08-06 PROCEDURE — 83540 ASSAY OF IRON: CPT

## 2020-08-06 PROCEDURE — 84436 ASSAY OF TOTAL THYROXINE: CPT

## 2020-08-06 PROCEDURE — 73610 X-RAY EXAM OF ANKLE: CPT

## 2020-08-06 PROCEDURE — 82728 ASSAY OF FERRITIN: CPT

## 2020-08-06 PROCEDURE — 83605 ASSAY OF LACTIC ACID: CPT

## 2020-08-06 PROCEDURE — 36415 COLL VENOUS BLD VENIPUNCTURE: CPT

## 2020-08-06 PROCEDURE — 83880 ASSAY OF NATRIURETIC PEPTIDE: CPT

## 2020-08-06 PROCEDURE — 70551 MRI BRAIN STEM W/O DYE: CPT

## 2020-08-06 PROCEDURE — 85730 THROMBOPLASTIN TIME PARTIAL: CPT

## 2020-08-06 PROCEDURE — 99285 EMERGENCY DEPT VISIT HI MDM: CPT | Mod: 25

## 2020-08-06 PROCEDURE — 96360 HYDRATION IV INFUSION INIT: CPT

## 2020-08-06 PROCEDURE — 93005 ELECTROCARDIOGRAM TRACING: CPT

## 2020-08-06 PROCEDURE — 87086 URINE CULTURE/COLONY COUNT: CPT

## 2020-08-06 PROCEDURE — 83550 IRON BINDING TEST: CPT

## 2020-08-06 PROCEDURE — 81001 URINALYSIS AUTO W/SCOPE: CPT

## 2020-08-06 PROCEDURE — U0003: CPT

## 2020-08-06 PROCEDURE — 97163 PT EVAL HIGH COMPLEX 45 MIN: CPT

## 2020-08-06 PROCEDURE — 86769 SARS-COV-2 COVID-19 ANTIBODY: CPT

## 2020-08-06 RX ADMIN — Medication 1 TABLET(S): at 11:18

## 2020-08-06 RX ADMIN — CEFTRIAXONE 100 MILLIGRAM(S): 500 INJECTION, POWDER, FOR SOLUTION INTRAMUSCULAR; INTRAVENOUS at 11:18

## 2020-08-06 RX ADMIN — Medication 81 MILLIGRAM(S): at 11:18

## 2020-08-06 RX ADMIN — Medication 250 MILLIGRAM(S): at 05:16

## 2020-08-06 RX ADMIN — PREGABALIN 1000 MICROGRAM(S): 225 CAPSULE ORAL at 11:18

## 2020-08-06 RX ADMIN — Medication 100 MILLIGRAM(S): at 11:18

## 2020-08-06 RX ADMIN — HEPARIN SODIUM 5000 UNIT(S): 5000 INJECTION INTRAVENOUS; SUBCUTANEOUS at 05:16

## 2020-08-06 NOTE — DISCHARGE NOTE NURSING/CASE MANAGEMENT/SOCIAL WORK - PATIENT PORTAL LINK FT
You can access the FollowMyHealth Patient Portal offered by [facility] by registering at the following website: [email or web address]. By joining [facility]’s FollowMyHealth portal, you will also be able to view your health information using other applications (apps) compatible with our system.

## 2020-08-06 NOTE — CHART NOTE - NSCHARTNOTEFT_GEN_A_CORE
Source: Patient [x ]  Family [ ]   other [x ] EMR and staff     Current Diet: Diet, Regular:   Supplement Feeding Modality:  Oral  Ensure Enlive Cans or Servings Per Day:  1       Frequency:  Two Times a day (08-04-20 @ 13:42)    PO intake:  < 50% [ ]   50-75%  [x ]   %  [ ]  other :    Source for PO intake [ ] Patient [ ] family [x ] chart [ ] staff [ ] other    Current Weight:   7/29: 52.2kg    % Weight Change (N/A) no new wt to assess at this time    Pertinent Medications: MEDICATIONS  (STANDING):  aspirin enteric coated 81 milliGRAM(s) Oral daily  cefTRIAXone   IVPB 1000 milliGRAM(s) IV Intermittent every 24 hours  cyanocobalamin 1000 MICROGram(s) Oral daily  donepezil 5 milliGRAM(s) Oral at bedtime  heparin   Injectable 5000 Unit(s) SubCutaneous every 12 hours  melatonin 5 milliGRAM(s) Oral at bedtime  multivitamin 1 Tablet(s) Oral daily  saccharomyces boulardii 250 milliGRAM(s) Oral two times a day  thiamine 100 milliGRAM(s) Oral daily    MEDICATIONS  (PRN):  acetaminophen   Tablet .. 650 milliGRAM(s) Oral every 6 hours PRN Mild Pain (1 - 3), Moderate Pain (4 - 6)  ALBUTerol    90 MICROgram(s) HFA Inhaler 2 Puff(s) Inhalation every 6 hours PRN Shortness of Breath and/or Wheezing    Pertinent Labs: CBC Full  -  ( 05 Aug 2020 07:45 )  WBC Count : 9.73 K/uL  RBC Count : 2.85 M/uL  Hemoglobin : 9.8 g/dL  Hematocrit : 29.5 %  Platelet Count - Automated : 477 K/uL  Mean Cell Volume : 103.5 fl  Mean Cell Hemoglobin : 34.4 pg  Mean Cell Hemoglobin Concentration : 33.2 gm/dL  Auto Neutrophil # : x  Auto Lymphocyte # : x  Auto Monocyte # : x  Auto Eosinophil # : x  Auto Basophil # : x  Auto Neutrophil % : x  Auto Lymphocyte % : x  Auto Monocyte % : x  Auto Eosinophil % : x  Auto Basophil % : x        Skin: No breakdown noted.     Nutrition focused physical exam previously conducted - found signs of malnutrition [ ]absent [x ]present    Subcutaneous fat loss: [x ] Orbital fat pads region, [x ]Buccal fat region, [x ]Triceps region,  [ ]Ribs region    Muscle wasting: [x ]Temples region, x[ ]Clavicle region, [x ]Shoulder region, [ ]Scapula region, [ ]Interosseous region,  [ ]thigh region, [ ]Calf region    Estimated Needs:   [x ] no change since previous assessment  [ ] recalculated:     Current Nutrition Diagnosis:  Pt remains at high nutrition risk secondary to Moderate-chronic protein calorie malnutrition related to inability to meet sufficient protein energy requirements in setting of advanced age with dementia as evidenced by meeting <75% estimated energy requirements > 1 month 11% weight loss in last few months and physical signs of muscle mass loss in temple, clavicle and shoulder regions and fat loss in orbital and buccal regions. Pt pleasantly confused however reports eating well at meals, per documentation pt consuming approximately 75% at meals. Pt reports liking and drinking Ensure at meals. Recommendations below:     Recommendations:   1. RX: MVI and Vit. C daily   2. Check wt daily to monitor trends  3. Encourage/assistance with meals.   4. Continue with Ensure TID     Monitoring and Evaluation:   [x ] PO intake [ x] Tolerance to diet prescription [X] Weights  [X] Follow up per protocol [X] Labs:

## 2020-08-06 NOTE — PROGRESS NOTE ADULT - ASSESSMENT
80 y/o female with PMH of dementia not on any medications, lives alone with periodically visits from niece and nephew, On 7/29/20 Niece Rosita came to visit to check on her and when entered home pt was noted to be covered with copious amount of stool, very confused, which is not her baseline. Pt was brought to the ER via EMS, and did not have any BM/ Loose stools in the ER, and had been quite alert and co-operative in the ER, likely at baseline.  Pt had no complaints of chest pain, abdominal pain, N/V, fever or urine symptoms. Pt also denies any fall/trauma, cough, or fever.  Per niece, pt was not recently on antibiotic,  sometimes has food brought by family.  Pt was admitted for further evaluation of AMS, MR of brain neg for stroke , likely due to advanced dementia , pending NH placement , foul smelling urine  , UA ordered + nitrite , started on empirical rocephin for UTI , cultures send contaminated reordered , leukocytosis is trending down , afebrile   stable     1- UTI supected abnormal UA   cont empirical abx for suspected UTI   repeat urine cx sterile collection requested by nurse ordered   afebrile,wbc trends down   stable will discharge to rehab on po abx to complet course   3 rd dose of rocephin to be given today before discharge    most likely   cont rocephin , cbc is trending down   follow up urine cx and change to po abx prior discharge     2- Dementia with acute encephelopathy  on admission   at baseline now stable   infection stroke ruled out on admission      3- Hypotension resolved   repeat BP is now improved 130's  stop amlodipine monitor closely     4- Anemia -cronic disease   hb is stable   cont folic acid and vit b 12 supplement     5- Moderate protein uriel malnutrition   rec ensure and MVI     6- Hypokalemia resolved   replaced    discharge to [facility] today

## 2020-08-06 NOTE — PROGRESS NOTE ADULT - PROVIDER SPECIALTY LIST ADULT
Hospitalist
Neurology
Hospitalist

## 2020-08-06 NOTE — PROGRESS NOTE ADULT - SUBJECTIVE AND OBJECTIVE BOX
Internal Medicine Hospitalist Progress Note    follow up for altered mental status , dementia   pt is seen resting in the bed , fouls smelling urine   pt is with dementia can not get reliable history       Vital Signs Last 24 Hrs  T(C): 36.6 (04 Aug 2020 08:42), Max: 36.7 (04 Aug 2020 05:51)  T(F): 97.8 (04 Aug 2020 08:42), Max: 98 (04 Aug 2020 05:51)  HR: 68 (04 Aug 2020 08:42) (68 - 72)  BP: 154/63 (04 Aug 2020 08:42) (143/60 - 154/63)  BP(mean): --  RR: 18 (04 Aug 2020 08:42) (18 - 18)  SpO2: 99% (04 Aug 2020 08:42) (99% - 100%)        Constitutional: awake alert , no distress     Respiratory: CTA bilateral     Cardiovascular: regular s1/ s2     Gastrointestinal: soft no tenderness , BS positive     Extremities: no pretibial edema     Skin ; normal color no rash     Urinalysis + Microscopic Examination (08.04.20 @ 09:23)    Urine Appearance: Cloudy    Urobilinogen: Negative    Specific Gravity: 1.010    Protein, Urine: 100    pH Urine: 8.0    Leukocyte Esterase Concentration: Moderate    Nitrite: Positive    Ketone - Urine: Negative    Bilirubin: Negative    Color: Yellow    Glucose Qualitative, Urine: Negative    Blood, Urine: Moderate    Red Blood Cell - Urine: 6-10 /HPF    White Blood Cell - Urine: 11-25    Epithelial Cells: Occasional    Bacteria: Moderate        LABS: ordered                 Radiology :      < from: MR Head No Cont (08.01.20 @ 18:18) >  IMPRESSION:    No acute infarct or intracranial hemorrhage.    Extensive chronic microvascular disease.      < end of copied text >
BARBARA SCHUMER    [id number]    79y      Female    CC: confusion    INTERVAL HPI/OVERNIGHT EVENTS: pt seen and examined. awake, eating breakfast. pleasant mood. denies cp, sob, weakness.     REVIEW OF SYSTEMS:    CONSTITUTIONAL: No fever, weight loss, or fatigue  RESPIRATORY: No cough, wheezing, hemoptysis; No shortness of breath  CARDIOVASCULAR: No chest pain, palpitations  GASTROINTESTINAL: No abdominal or epigastric pain. No nausea, vomiting  NEUROLOGICAL: No headaches, loss of strength.    Vital Signs Last 24 Hrs  T(C): 36.7 (2020 09:58), Max: 36.9 (2020 00:04)  T(F): 98.1 (2020 09:58), Max: 98.5 (2020 00:04)  HR: 102 (2020 09:58) (66 - 102)  BP: 139/68 (2020 09:58) (139/68 - 153/76)  BP(mean): --  RR: 18 (2020 09:58) (18 - 18)  SpO2: 97% (2020 09:58) (97% - 99%)    PHYSICAL EXAM:    GENERAL: NAD  HEENT:  +EOMI  NECK: soft, supple  CHEST/LUNG: Clear to auscultation bilaterally; No wheezing  HEART: S1S2+, Regular rate and rhythm  ABDOMEN: Soft, Nontender, Nondistended; Bowel sounds present  SKIN: warm, dry  NEURO: AAOX1  PSYCH: normal affect    LABS:                        10.2   9.95  )-----------( 495      ( 2020 07:53 )             30.5     07-31    141  |  103  |  9.0  ----------------------------<  109<H>  3.5   |  24.0  |  0.82    Ca    9.4      2020 07:53  Mg     1.9     07-31    TPro  7.1  /  Alb  4.5  /  TBili  0.8  /  DBili  x   /  AST  19  /  ALT  6   /  AlkPhos  51  07-29    PT/INR - ( 2020 07:36 )   PT: 13.7 sec;   INR: 1.19 ratio         PTT - ( 2020 07:36 )  PTT:27.0 sec  Urinalysis Basic - ( 2020 22:39 )    Color: Yellow / Appearance: Clear / S.020 / pH: x  Gluc: x / Ketone: Small  / Bili: Negative / Urobili: Negative mg/dL   Blood: x / Protein: 30 mg/dL / Nitrite: Negative   Leuk Esterase: Negative / RBC: 0-2 /HPF / WBC 0-2   Sq Epi: x / Non Sq Epi: Occasional / Bacteria: Occasional          MEDICATIONS  (STANDING):  donepezil 5 milliGRAM(s) Oral at bedtime  heparin   Injectable 5000 Unit(s) SubCutaneous every 12 hours  multivitamin 1 Tablet(s) Oral daily  thiamine 100 milliGRAM(s) Oral daily    MEDICATIONS  (PRN):  acetaminophen   Tablet .. 650 milliGRAM(s) Oral every 6 hours PRN Mild Pain (1 - 3), Moderate Pain (4 - 6)  ALBUTerol    90 MICROgram(s) HFA Inhaler 2 Puff(s) Inhalation every 6 hours PRN Shortness of Breath and/or Wheezing      RADIOLOGY & ADDITIONAL TESTS:
BARBARA SCHUMER    [id number]    79y      Female    CC: confusion    INTERVAL HPI/OVERNIGHT EVENTS: pt seen and examined. sitting up in bed eating breakfast. pleasant.     REVIEW OF SYSTEMS:    CONSTITUTIONAL: No fever, weight loss  RESPIRATORY: No cough, wheezing, hemoptysis; No shortness of breath  CARDIOVASCULAR: No chest pain, palpitations  GASTROINTESTINAL: No abdominal or epigastric pain. No nausea, vomiting  NEUROLOGICAL: No headaches    Vital Signs Last 24 Hrs  T(C): 36.8 (02 Aug 2020 09:12), Max: 37.2 (01 Aug 2020 18:20)  T(F): 98.3 (02 Aug 2020 09:12), Max: 99 (01 Aug 2020 18:20)  HR: 74 (02 Aug 2020 09:12) (74 - 96)  BP: 134/71 (02 Aug 2020 09:12) (134/71 - 152/68)  BP(mean): --  RR: 18 (02 Aug 2020 09:12) (18 - 18)  SpO2: 98% (02 Aug 2020 09:12) (96% - 98%)    PHYSICAL EXAM:    GENERAL: NAD  HEENT: +EOMI  NECK: soft, supple  CHEST/LUNG: Clear to auscultation bilaterally; No wheezing  HEART: S1S2+, Regular rate and rhythm; No murmurs, rubs, or gallops  ABDOMEN: Soft, Nontender, Nondistended; Bowel sounds present  SKIN: warm, dry  NEURO: AAOX1, non-focal  PSYCH: normal affect    LABS:        MEDICATIONS  (STANDING):  donepezil 5 milliGRAM(s) Oral at bedtime  heparin   Injectable 5000 Unit(s) SubCutaneous every 12 hours  melatonin 5 milliGRAM(s) Oral at bedtime  multivitamin 1 Tablet(s) Oral daily  thiamine 100 milliGRAM(s) Oral daily    MEDICATIONS  (PRN):  acetaminophen   Tablet .. 650 milliGRAM(s) Oral every 6 hours PRN Mild Pain (1 - 3), Moderate Pain (4 - 6)  ALBUTerol    90 MICROgram(s) HFA Inhaler 2 Puff(s) Inhalation every 6 hours PRN Shortness of Breath and/or Wheezing      RADIOLOGY & ADDITIONAL TESTS:    < from: MR Head No Cont (08.01.20 @ 18:18) >  INTERPRETATION:  CLINICAL INFORMATION:  Altered mental status.    TECHNIQUE: Multiplanar multisequence MR imaging of the brain without the administration of intravenous contrast.    COMPARISION: Noncontrast CT head of 7/29/2020.    FINDINGS:    There is no hydrocephalus, midline shift, mass effect, or intracranial hemorrhage.  Diffusion weighted images demonstrate no acute territorial infarct. Cerebral volume loss results in prominence of the ventricles and sulci. Extensive T2/FLAIR signal abnormality within the periventricular and subcortical white matter, although nonspecific, likely represents the sequelae of chronic microvascular disease Flow voids are seen within the major intracranial vessels consistent with their patency.    Trace opacification of the left inferior mastoid air cells. Right mastoid air cells are paranasal sinuses are grossly clear. Status post bilateral ocular lens replacement. The sellar and suprasellar structures, and craniocervical junction are unremarkable.    IMPRESSION:    No acute infarct or intracranial hemorrhage.    Extensive chronic microvascular disease.    < end of copied text >
BARBARA SCHUMER    [id number]    79y      Female    CC: confusion    INTERVAL HPI/OVERNIGHT EVENTS: pt seen and examined. as per RN, restless overnight. calm in am at time of eval.     REVIEW OF SYSTEMS:    CONSTITUTIONAL: No fever, weight loss  RESPIRATORY: No cough, wheezing, hemoptysis; No shortness of breath  CARDIOVASCULAR: No chest pain, palpitations  GASTROINTESTINAL: No abdominal or epigastric pain. No nausea, vomiting    Vital Signs Last 24 Hrs  T(C): 36.6 (01 Aug 2020 08:48), Max: 36.8 (31 Jul 2020 16:18)  T(F): 97.8 (01 Aug 2020 08:48), Max: 98.2 (31 Jul 2020 16:18)  HR: 81 (01 Aug 2020 08:48) (81 - 106)  BP: 146/66 (01 Aug 2020 08:48) (146/66 - 163/69)  BP(mean): --  RR: 18 (01 Aug 2020 08:48) (18 - 20)  SpO2: 97% (01 Aug 2020 08:48) (97% - 97%)    PHYSICAL EXAM:    GENERAL: NAD  HEENT:  +EOMI  NECK: soft, supple  CHEST/LUNG: Clear to auscultation bilaterally; No wheezing  HEART: S1S2+, Regular rate and rhythm  ABDOMEN: Soft, Nontender, Nondistended; Bowel sounds present  SKIN: warm, dry  NEURO: AAOX1  PSYCH: normal affect    LABS:                        10.2   9.95  )-----------( 495      ( 31 Jul 2020 07:53 )             30.5     07-31    141  |  103  |  9.0  ----------------------------<  109<H>  3.5   |  24.0  |  0.82    Ca    9.4      31 Jul 2020 07:53  Mg     1.9     07-31              MEDICATIONS  (STANDING):  donepezil 5 milliGRAM(s) Oral at bedtime  heparin   Injectable 5000 Unit(s) SubCutaneous every 12 hours  melatonin 5 milliGRAM(s) Oral at bedtime  multivitamin 1 Tablet(s) Oral daily  thiamine 100 milliGRAM(s) Oral daily    MEDICATIONS  (PRN):  acetaminophen   Tablet .. 650 milliGRAM(s) Oral every 6 hours PRN Mild Pain (1 - 3), Moderate Pain (4 - 6)  ALBUTerol    90 MICROgram(s) HFA Inhaler 2 Puff(s) Inhalation every 6 hours PRN Shortness of Breath and/or Wheezing      RADIOLOGY & ADDITIONAL TESTS:
CC:   INTERVAL HPI/OVERNIGHT EVENTS: Pt seen and examined at bedside this AM by Hospitalist and PA. Pt reports she is feeling better today.  She would like to get up and walk around, she is tired of being in bed.  Denies any overnight events.  1:1 sitter reports pt was very agitated overnight.  Denies CP, SOB, headache, incontinence of bladder or bowel.    REVIEW OF SYSTEMS:  CONSTITUTIONAL: No fever, weight loss, or fatigue  EYES: No eye pain, visual disturbances, or discharge  ENT:  No difficulty hearing, tinnitus, vertigo; No sinus or throat pain  NECK: No pain or stiffness  RESPIRATORY: No cough, wheezing, chills or hemoptysis; No shortness of breath  CARDIOVASCULAR: No chest pain, palpitations, dizziness, or leg swelling  GASTROINTESTINAL: No abdominal or epigastric pain. No nausea, vomiting, or hematemesis; No diarrhea or constipation.  GENITOURINARY: No dysuria, frequency, hematuria, or incontinence  NEUROLOGICAL: No headaches, memory loss, loss of strength, numbness, or tremors  SKIN: No itching, burning, rashes, or lesions   MUSCULOSKELETAL: No joint pain or swelling; No muscle, back, or extremity pain    Allergies    No Known Allergies    Intolerances      HEALTH ISSUES - PROBLEM Dx:      PAST MEDICAL & SURGICAL HISTORY:  Dementia  S/P hysterectomy    VITAL SIGNS:  T(C): 36.6 (20 @ 11:40), Max: 37.2 (20 @ 17:47)  HR: 83 (20 @ 11:40) (60 - 92)  BP: 183/74 (20 @ 11:40) (131 - 183/)  RR: 17 (20 @ 11:40) (16 - 18)  SpO2: 98% (20 @ 11:40) (95% - 100%)  Wt(kg): --Vital Signs Last 24 Hrs  T(C): 36.6 (2020 11:40), Max: 37.2 (2020 17:47)  T(F): 97.8 (:40), Max: 98.9 (2020 17:47)  HR: 83 (2020 11:40) (60 - 92)  BP: 183/74 (2020 11:40) (131/66 - 183/74)  BP(mean): 82 (2020 07:53) (82 - 82)  RR: 17 (2020 11:40) (16 - 18)  SpO2: 98% (2020 11:40) (95% - 100%)    PHYSICAL EXAM:  GENERAL: Pt sitting in bed comfortably in NAD  HEAD:  Atraumatic  EYES: EOMI, PERRL, conjunctiva and sclera clear  ENT: Moist mucous membranes  NECK: Supple, No JVD  CHEST/LUNG: Clear to auscultation bilaterally; No rales, rhonchi, wheezing, or rubs. Unlabored respirations  HEART: Regular rate and rhythm; No murmurs, rubs, or gallops  ABDOMEN: Bowel sounds present; Soft, Nontender, Nondistended. No guarding or rigidity   EXTREMITIES:  2+ Peripheral Pulses, brisk capillary refill. No clubbing, cyanosis, or edema  NERVOUS SYSTEM:  Alert & Oriented X3, speech clear, FROM x 4 extremities. No deficits   SKIN: No rashes or lesions    LABS:                        10.7   10.31 )-----------( 506      ( 2020 18:53 )             32.4     07-30    142  |  107  |  13.0  ----------------------------<  106<H>  3.4<L>   |  21.0<L>  |  0.79    Ca    8.8      2020 07:36  Mg     2.1     07-29    TPro  7.1  /  Alb  4.5  /  TBili  0.8  /  DBili  x   /  AST  19  /  ALT  6   /  AlkPhos  51  07-29    PT/INR - ( 2020 07:36 )   PT: 13.7 sec;   INR: 1.19 ratio         PTT - ( 2020 07:36 )  PTT:27.0 sec  CAPILLARY BLOOD GLUCOSE          Urinalysis Basic - ( 2020 22:39 )    Color: Yellow / Appearance: Clear / S.020 / pH: x  Gluc: x / Ketone: Small  / Bili: Negative / Urobili: Negative mg/dL   Blood: x / Protein: 30 mg/dL / Nitrite: Negative   Leuk Esterase: Negative / RBC: 0-2 /HPF / WBC 0-2   Sq Epi: x / Non Sq Epi: Occasional / Bacteria: Occasional     EXAM:  CT BRAIN                          PROCEDURE DATE:  2020      IMPRESSION:  1)  extensive chronic ischemic changes in both hemispheres as well as within the posterior fossa.  2)  follow-up MR imaging of the brain is recommended to rule out the possibility of a brain stem infarct, as there are artifacts which obscure detail in theposterior fossa.                PEREZ MOSS M.D., ATTENDING RADIOLOGIST  This document has been electronically signed. 2020  7:08PM       EXAM:  ANKLE-LEFT                          PROCEDURE DATE:  2020        IMPRESSION:   No acute radiographic osseous pathology..    If pain persist despite conservative therapy and soft tissue internal derangement or occult fracture is clinically suspected follow-upMRI recommended.              AN RUVALCABA M.D., ATTENDING RADIOLOGIST  This document has been electronically signed. 2020 10:29AM
INTERVAL HISTORY:  Seen at bedside and no new changes.  Feels more energetic and comfortable.    No Known Allergies      VITAL SIGNS:  Vital Signs Last 24 Hrs  T(C): 37.2 (03 Aug 2020 05:19), Max: 37.2 (03 Aug 2020 05:19)  T(F): 98.9 (03 Aug 2020 05:19), Max: 98.9 (03 Aug 2020 05:19)  HR: 79 (03 Aug 2020 05:19) (75 - 86)  BP: 157/72 (03 Aug 2020 05:19) (143/64 - 157/72)  BP(mean): --  RR: 18 (03 Aug 2020 05:19) (18 - 18)  SpO2: 100% (03 Aug 2020 05:19) (95% - 100%)    PHYSICAL EXAMINATION:  General: Well-developed, well nourished, in no acute distress.  Eyes: Conjunctiva and sclera clear.  Neurologic:  - Mental Status:  Alert, awake, oriented to self; Speech is normal; Affect is normal.  - Cranial Nerves: II-XI intact.  - Motor:  Strength is full and symmetric throughout.  There is no pronator drift.  Normal muscle bulk and tone throughout.  - Reflexes:  2+ throughout  - Sensory:  Intact to light touch, pin prick, vibration, and joint-position sense throughout.  - Coordination:  No dysmetria/dysdiadochokinesis.    MEDS:  MEDICATIONS  (STANDING):  donepezil 5 milliGRAM(s) Oral at bedtime  heparin   Injectable 5000 Unit(s) SubCutaneous every 12 hours  melatonin 5 milliGRAM(s) Oral at bedtime  multivitamin 1 Tablet(s) Oral daily  thiamine 100 milliGRAM(s) Oral daily    MEDICATIONS  (PRN):  acetaminophen   Tablet .. 650 milliGRAM(s) Oral every 6 hours PRN Mild Pain (1 - 3), Moderate Pain (4 - 6)  ALBUTerol    90 MICROgram(s) HFA Inhaler 2 Puff(s) Inhalation every 6 hours PRN Shortness of Breath and/or Wheezing      LABS:        RADIOLOGY & ADDITIONAL STUDIES:      < from: CT Head No Cont (07.29.20 @ 19:14) >  1)  extensive chronic ischemic changes in both hemispheres as well as within the posterior fossa.  2)  follow-up MR imaging of the brain is recommended to rule out the possibility of a brain stem infarct, as there are artifacts which obscure detail in theposterior fossa.      < from: MR Head No Cont (08.01.20 @ 18:18) >  No acute infarct or intracranial hemorrhage.  Extensive chronic microvascular disease.
INTERVAL HISTORY:  stable, no interval changes      VITAL SIGNS:  Vital Signs Last 24 Hrs  T(C): 36.7 (31 Jul 2020 09:58), Max: 36.9 (31 Jul 2020 00:04)  T(F): 98.1 (31 Jul 2020 09:58), Max: 98.5 (31 Jul 2020 00:04)  HR: 102 (31 Jul 2020 09:58) (66 - 102)  BP: 139/68 (31 Jul 2020 09:58) (139/68 - 183/74)  BP(mean): --  RR: 18 (31 Jul 2020 09:58) (17 - 18)  SpO2: 97% (31 Jul 2020 09:58) (97% - 99%)    PHYSICAL EXAMINATION:    Mentation:  awake cooperative oriented to name  Language/Speech: nl  CN: nl  Visual Fields:  Motor: nl   Sensory:  DTR:  Babinski:      MEDS:  MEDICATIONS  (STANDING):  donepezil 5 milliGRAM(s) Oral at bedtime  heparin   Injectable 5000 Unit(s) SubCutaneous every 12 hours  multivitamin 1 Tablet(s) Oral daily  thiamine 100 milliGRAM(s) Oral daily    MEDICATIONS  (PRN):  acetaminophen   Tablet .. 650 milliGRAM(s) Oral every 6 hours PRN Mild Pain (1 - 3), Moderate Pain (4 - 6)  ALBUTerol    90 MICROgram(s) HFA Inhaler 2 Puff(s) Inhalation every 6 hours PRN Shortness of Breath and/or Wheezing      LABS:                          10.2   9.95  )-----------( 495      ( 31 Jul 2020 07:53 )             30.5     07-31    141  |  103  |  9.0  ----------------------------<  109<H>  3.5   |  24.0  |  0.82    Ca    9.4      31 Jul 2020 07:53  Mg     1.9     07-31    TPro  7.1  /  Alb  4.5  /  TBili  0.8  /  DBili  x   /  AST  19  /  ALT  6   /  AlkPhos  51  07-29    LIVER FUNCTIONS - ( 29 Jul 2020 18:53 )  Alb: 4.5 g/dL / Pro: 7.1 g/dL / ALK PHOS: 51 U/L / ALT: 6 U/L / AST: 19 U/L / GGT: x               RADIOLOGY & ADDITIONAL STUDIES:    MRI pending    IMPRESSION & PLAN:      Dementia- vascular and or  AD\    Plan   continue donepzil  Await MRI to r/o acute stroke (doubt)  Dispo planning. Cannot go home and live alone
Internal Medicine Hospitalist Progress Note    follow up for altered mental status   pt is seen in am , calm cooperative pleasant   confused to place and time         ROS: as above, all remaining ROS are negative       BACKGROUND:  MEDICATIONS  (STANDING):  donepezil 5 milliGRAM(s) Oral at bedtime  heparin   Injectable 5000 Unit(s) SubCutaneous every 12 hours  melatonin 5 milliGRAM(s) Oral at bedtime  multivitamin 1 Tablet(s) Oral daily  thiamine 100 milliGRAM(s) Oral daily    MEDICATIONS  (PRN):  acetaminophen   Tablet .. 650 milliGRAM(s) Oral every 6 hours PRN Mild Pain (1 - 3), Moderate Pain (4 - 6)  ALBUTerol    90 MICROgram(s) HFA Inhaler 2 Puff(s) Inhalation every 6 hours PRN Shortness of Breath and/or Wheezing    Allergies    No Known Allergies    Intolerances            VITALS:  Vital Signs Last 24 Hrs  T(C): 37.2 (03 Aug 2020 05:19), Max: 37.2 (03 Aug 2020 05:19)  T(F): 98.9 (03 Aug 2020 05:19), Max: 98.9 (03 Aug 2020 05:19)  HR: 79 (03 Aug 2020 05:19) (75 - 86)  BP: 157/72 (03 Aug 2020 05:19) (143/64 - 157/72)  BP(mean): --  RR: 18 (03 Aug 2020 05:19) (18 - 18)  SpO2: 100% (03 Aug 2020 05:19) (95% - 100%) Daily     Daily Weight in k.9 (03 Aug 2020 10:22)  CAPILLARY BLOOD GLUCOSE        I&O's Summary      PHYSICAL EXAM:      Constitutional: awake alert , no distress     Respiratory: CTA bilateral     Cardiovascular: regular s1/ s2     Gastrointestinal: soft no tenderness , BS positive     Extremities: no pretibial edema     Skin ; normal color no rash         LABS:                Radiology :      < from: MR Head No Cont (20 @ 18:18) >  IMPRESSION:    No acute infarct or intracranial hemorrhage.    Extensive chronic microvascular disease.      < end of copied text >
Internal Medicine Hospitalist Progress Note    follow up for altered mental status , dementia, UTI   pt is seen in am , she is c/o feeling weak, BP is low per nurse this am   pt denies pain , no CP or SOB     Vital Signs Last 24 Hrs  T(C): 36.6 (05 Aug 2020 07:45), Max: 36.6 (05 Aug 2020 07:45)  T(F): 97.8 (05 Aug 2020 07:45), Max: 97.8 (05 Aug 2020 07:45)  HR: 68 (05 Aug 2020 07:45) (64 - 72)  BP: 135/55 (05 Aug 2020 07:45) (81/48 - 137/65)  BP(mean): --  RR: 18 (05 Aug 2020 07:45) (18 - 18)  SpO2: 99% (05 Aug 2020 07:45) (99% - 99%)      Constitutional: awake alert , no distress     Respiratory: CTA bilateral     Cardiovascular: regular s1/ s2     Gastrointestinal: soft no tenderness , BS positive , no distention     Extremities: no pretibial edema     Skin ; normal color no rash                           9.8    9.73  )-----------( 477      ( 05 Aug 2020 07:45 )             29.5   WBC Count: 12.46 K/uL (08.04.20 @ 11:34)      08-05    140  |  102  |  23.0<H>  ----------------------------<  93  4.2   |  25.0  |  0.92    Ca    9.5      05 Aug 2020 07:45  Phos  3.1     08-04        Radiology :      < from: MR Head No Cont (08.01.20 @ 18:18) >  IMPRESSION:    No acute infarct or intracranial hemorrhage.    Extensive chronic microvascular disease.      < end of copied text >
Internal Medicine Hospitalist Progress Note    follow up for altered mental status , dementia, suspected  UTI   pt is seen earlier , awake alert sitting in the chair   feels better   urine cx result > 3 microorganism     Vital Signs Last 24 Hrs  T(C): 37.1 (06 Aug 2020 08:10), Max: 37.1 (06 Aug 2020 08:10)  T(F): 98.8 (06 Aug 2020 08:10), Max: 98.8 (06 Aug 2020 08:10)  HR: 73 (06 Aug 2020 08:10) (68 - 84)  BP: 153/72 (06 Aug 2020 08:10) (132/60 - 159/78)  BP(mean): --  RR: 20 (06 Aug 2020 08:10) (18 - 20)  SpO2: 98% (06 Aug 2020 08:10) (98% - 99%)    Constitutional: awake alert , no distress     Respiratory: CTA bilateral     Cardiovascular: regular s1/ s2     Gastrointestinal: soft no tenderness , BS positive , no distention     Extremities: no pretibial edema     Skin ; normal color no rash                                           9.8    9.73  )-----------( 477      ( 05 Aug 2020 07:45 )             29.5   08-05    140  |  102  |  23.0<H>  ----------------------------<  93  4.2   |  25.0  |  0.92    Ca    9.5      05 Aug 2020 07:45  Phos  3.1     08-04          Radiology :      < from: MR Head No Cont (08.01.20 @ 18:18) >  IMPRESSION:    No acute infarct or intracranial hemorrhage.    Extensive chronic microvascular disease.      < end of copied text >
Interval History:  No new events; pt walking with PT and using a walker now    MEDICATIONS  (STANDING):  donepezil 5 milliGRAM(s) Oral at bedtime  heparin   Injectable 5000 Unit(s) SubCutaneous every 12 hours  melatonin 5 milliGRAM(s) Oral at bedtime  multivitamin 1 Tablet(s) Oral daily  thiamine 100 milliGRAM(s) Oral daily    MEDICATIONS  (PRN):  acetaminophen   Tablet .. 650 milliGRAM(s) Oral every 6 hours PRN Mild Pain (1 - 3), Moderate Pain (4 - 6)  ALBUTerol    90 MICROgram(s) HFA Inhaler 2 Puff(s) Inhalation every 6 hours PRN Shortness of Breath and/or Wheezing      Allergies    No Known Allergies    Intolerances        PHYSICAL EXAM:  Vital Signs Last 24 Hrs  T(F): 97.8 (08-01-20 @ 08:48)  HR: 81 (08-01-20 @ 08:48)  BP: 146/66 (08-01-20 @ 08:48)  RR: 18 (08-01-20 @ 08:48)    GENERAL: NAD, well-groomed, well-developed  HEAD:  Atraumatic, Normocephalic  EYES: EOMI, PERRLA, conjunctiva and sclera clear  NECK: Supple, No JVD, Normal thyroid, no carotid bruit bilateral  NERVOUS SYSTEM:  Alert & Oriented to self only, speech and language normal,   Cranial nerves II-XII normal,   Good concentration; Motor Strength 5/5 B/L upper and lower extremities; DTRs 2+ intact and symmetric, plantar responses flexor bilaterally,   Sensory exam normal to light touch, pin prick and position sense,   stance and gait uses a walker, no dysmetri bilaterally  HEART: Regular rate and rhythm; No murmurs, rubs, or gallops    LABS:                        10.2   9.95  )-----------( 495      ( 31 Jul 2020 07:53 )             30.5     07-31    141  |  103  |  9.0  ----------------------------<  109<H>  3.5   |  24.0  |  0.82    Ca    9.4      31 Jul 2020 07:53  Mg     1.9     07-31            RADIOLOGY & ADDITIONAL STUDIES:  < from: CT Head No Cont (07.29.20 @ 19:14) >   EXAM:  CT BRAIN                          PROCEDURE DATE:  07/29/2020          INTERPRETATION:  INDICATION:  Altered mental status  TECHNIQUE:  A non contrast 2.5mm axial CT study of the brain was performed from skull base to vertex. Coronal and sagittal reformations were generated from the axial data.  COMPARISON EXAMINATION:  No prior    FINDINGS:    HEMISPHERES:  Extensive chronic small vessel ischemic changes are noted in the basal ganglia and white matter of both hemispheres with volume loss. No acute infarct or hemorrhage is suggested.  VENTRICLES:  Ex vacuo enlargement is noted  POSTERIOR FOSSA:  Chronic ischemic changes are noted in the brainstem. A superimposed acute brainstem infarct cannot be excluded.  EXTRACEREBRAL SPACES:  No subdural or epidural collections are noted.  SKULL BASE AND CALVARIUM:  Appears intact.  No fracture or destructive lesion is identified.  SINUSES AND MASTOIDS:  Clear.  MISCELLANEOUS:  No orbital or suprasellar abnormality noted.    IMPRESSION:  1)  extensive chronic ischemic changes in both hemispheres as well as within the posterior fossa.  2)  follow-up MR imaging of the brain is recommended to rule out the possibility of a brain stem infarct, as there are artifacts which obscure detail in theposterior fossa.

## 2020-08-07 PROBLEM — D64.9 ANEMIA, UNSPECIFIED: Chronic | Status: ACTIVE | Noted: 2018-05-20

## 2020-08-07 LAB
CULTURE RESULTS: NO GROWTH — SIGNIFICANT CHANGE UP
SPECIMEN SOURCE: SIGNIFICANT CHANGE UP

## 2020-08-07 RX ORDER — FOLIC ACID 0.8 MG
1 TABLET ORAL
Qty: 0 | Refills: 0 | DISCHARGE

## 2020-08-07 RX ORDER — CIPROFLOXACIN LACTATE 400MG/40ML
1 VIAL (ML) INTRAVENOUS
Qty: 0 | Refills: 0 | DISCHARGE

## 2020-08-07 RX ORDER — NITROFURANTOIN MACROCRYSTAL 50 MG
1 CAPSULE ORAL
Qty: 0 | Refills: 0 | DISCHARGE

## 2020-08-07 RX ORDER — SACCHAROMYCES BOULARDII 250 MG
1 POWDER IN PACKET (EA) ORAL
Qty: 0 | Refills: 0 | DISCHARGE

## 2021-02-12 NOTE — PATIENT PROFILE ADULT - FALL HARM RISK
02/12/21 1455   OTHER   Discipline physical therapist   Rehab Time/Intention   Session Not Performed other (see comments)  (Not approrpiate for PT this date, per OSIRIS Granado. Will follow up tomorrow if appropriate.)   Recommendation   PT - Next Appointment 02/13/21      other
